# Patient Record
Sex: MALE | Race: WHITE | NOT HISPANIC OR LATINO | Employment: FULL TIME | URBAN - NONMETROPOLITAN AREA
[De-identification: names, ages, dates, MRNs, and addresses within clinical notes are randomized per-mention and may not be internally consistent; named-entity substitution may affect disease eponyms.]

---

## 2017-02-16 PROCEDURE — 96372 THER/PROPH/DIAG INJ SC/IM: CPT

## 2017-02-16 PROCEDURE — 99283 EMERGENCY DEPT VISIT LOW MDM: CPT

## 2017-02-17 ENCOUNTER — HOSPITAL ENCOUNTER (EMERGENCY)
Facility: HOSPITAL | Age: 27
Discharge: HOME | End: 2017-02-17
Attending: EMERGENCY MEDICINE | Admitting: EMERGENCY MEDICINE

## 2017-02-17 VITALS
RESPIRATION RATE: 17 BRPM | WEIGHT: 230 LBS | HEART RATE: 70 BPM | DIASTOLIC BLOOD PRESSURE: 60 MMHG | HEIGHT: 72 IN | OXYGEN SATURATION: 98 % | SYSTOLIC BLOOD PRESSURE: 122 MMHG | BODY MASS INDEX: 31.15 KG/M2 | TEMPERATURE: 98 F

## 2017-02-17 DIAGNOSIS — R51.9 NONINTRACTABLE HEADACHE, UNSPECIFIED CHRONICITY PATTERN, UNSPECIFIED HEADACHE TYPE: Primary | ICD-10-CM

## 2017-02-17 PROCEDURE — 63710000001 PROMETHAZINE PER 25 MG: Performed by: EMERGENCY MEDICINE

## 2017-02-17 PROCEDURE — 25010000002 KETOROLAC TROMETHAMINE PER 15 MG: Performed by: EMERGENCY MEDICINE

## 2017-02-17 RX ORDER — BUTALBITAL, ACETAMINOPHEN AND CAFFEINE 50; 325; 40 MG/1; MG/1; MG/1
1 TABLET ORAL EVERY 6 HOURS PRN
Qty: 10 TABLET | Refills: 0 | Status: SHIPPED | OUTPATIENT
Start: 2017-02-17 | End: 2017-09-01

## 2017-02-17 RX ORDER — KETOROLAC TROMETHAMINE 30 MG/ML
60 INJECTION, SOLUTION INTRAMUSCULAR; INTRAVENOUS ONCE
Status: COMPLETED | OUTPATIENT
Start: 2017-02-17 | End: 2017-02-17

## 2017-02-17 RX ORDER — PROMETHAZINE HYDROCHLORIDE 25 MG/1
25 TABLET ORAL ONCE
Status: COMPLETED | OUTPATIENT
Start: 2017-02-17 | End: 2017-02-17

## 2017-02-17 RX ADMIN — PROMETHAZINE HYDROCHLORIDE 25 MG: 25 TABLET ORAL at 01:20

## 2017-02-17 RX ADMIN — KETOROLAC TROMETHAMINE 60 MG: 30 INJECTION, SOLUTION INTRAMUSCULAR at 01:21

## 2017-02-17 NOTE — ED PROVIDER NOTES
TRIAGE CHIEF COMPLAINT:   Chief Complaint   Patient presents with   • Headache      HPI: Nikolas Cottrell is a 26 y.o. male who presents to the emergency department complaining of a headache.  Headache is been present over the past 2 days.  Patient describes a diffuse pounding headache that was gradual in onset.  Patient states he does not have a history of similar headaches.  Denies any trauma to his head.  Yesterday had some slight blurry vision and nausea but these have gone away and no longer has see symptoms.  No neck pain.  No fevers or chills.  No recent illness.  No numbness, tingling, or weakness in his extremities.     REVIEW OF SYSTEMS: Otherwise negative unless stated above     PAST MEDICAL HISTORY:   Past Medical History   Diagnosis Date   • Depression    • Seizures         FAMILY HISTORY:   History reviewed. No pertinent family history.     SOCIAL HISTORY:   Social History     Social History   • Marital status: Single     Spouse name: N/A   • Number of children: N/A   • Years of education: N/A     Occupational History   • Not on file.     Social History Main Topics   • Smoking status: Current Every Day Smoker   • Smokeless tobacco: Not on file   • Alcohol use Not on file   • Drug use: Not on file   • Sexual activity: Not on file     Other Topics Concern   • Not on file     Social History Narrative        SURGICAL HISTORY:   Past Surgical History   Procedure Laterality Date   • Septoplasty     • Hand surgery          CURRENT MEDICATIONS:      Medication List      Notice     You have not been prescribed any medications.         ALLERGIES: Review of patient's allergies indicates no known allergies.     PHYSICAL EXAM:   VITAL SIGNS:   Vitals:    02/17/17 0045   BP: 127/51   Pulse: 71   Resp: 18   Temp: 97.8 °F (36.6 °C)   SpO2: 97%      CONSTITUTIONAL: Awake, oriented, appears non-toxic   HENT: Atraumatic, normocephalic, oral mucosa pink and moist, airway patent. Nares patent without drainage.  External ears normal.   EYES: Conjunctiva clear, EOMI, PERRL   NECK: Trachea midline, non-tender, supple   CARDIOVASCULAR: Normal heart rate, Normal rhythm, No murmurs, rubs, gallops   PULMONARY/CHEST: Clear to auscultation, no rhonchi, wheezes, or rales. Symmetrical breath sounds.  ABDOMINAL: Non-distended, soft, non-tender - no rebound or guarding.  NEUROLOGIC: Non-focal, moving all four extremities, no gross sensory or motor deficits.   EXTREMITIES: No clubbing, cyanosis, or edema   SKIN: Warm, Dry, No erythema, No rash     ED COURSE / MEDICAL DECISION MAKING:   Nikolas Cottrell is a 26 y.o. male who presents to the emergency department for evaluation of a headache.  Patient in no acute distress on arrival.  Physical exam is unremarkable including a neurologic exam.  Patient has taken Tylenol at home but nothing else for the headache.  I don't feel imaging is currently indicated.  No neurological abnormalities, headache gradual in onset.  Treated the patient with Toradol and Phenergan in the emergency department with improvement in symptoms.  I think he is safe for discharged home.    DECISION TO DISCHARGE/ADMIT: see ED care timeline     FINAL IMPRESSION:   1 -- headache   2 --   3 --     Electronically signed by: Cecille Mason MD, 2/17/2017 1:06 AM       Cecille Mason MD  02/17/17 0226

## 2017-02-17 NOTE — DISCHARGE INSTRUCTIONS
"Most recent vital signs:   Visit Vitals   • /51   • Pulse 71   • Temp 97.8 °F (36.6 °C) (Oral)   • Resp 18   • Ht 72\" (182.9 cm)   • Wt 230 lb (104 kg)   • SpO2 97%   • BMI 31.19 kg/m2        Below you fill find any summaries of imaging results and further discharge instructions as discussed during your visit.     No orders to display        --PLEASE READ THESE DIRECTIONS IN FULL--     Our goal is to provide the best care we can AND to find any medical or surgical emergency while you are in the ER. If a medical or surgical emergency was not identified during your visit (or if the issue was resolved during the visit), following these directions for follow-up with a primary care provider and/or specialists and following the return precautions will be the safest and most effective way to protect your health after leaving the emergency room.     Therefore, in addition to the conversation we had in the room, please read all of the information in these discharge instructions, take medications as directed, and follow-up as directed.     You should seek immediate medical help for:     Worsening pain that is not helped with prescribed pain medicines and/or the recommended doses of over the counter pain medicines such as acetaminophen (Tylenol) or ibuprofen (Motrin/Advil)*.   New or worsening chest pain or trouble breathing   New or worsening headache, confusion, weakness, or visual changes   New or worsening fever (>100.4 F) that does not improve with the recommended doses of over the counter fever treatments such as acetaminophen (Tylenol) or ibuprofen (Motrin/Advil)* for more than a few hours.   Any other concerns that you feel could be life, limb, or eye-sight threatening     As a reminder, if you received any medicines or prescriptions for medicines that may be sedating (\"narcotics\", \"opioids\"), do NOT:   - operate any vehicles   - take if you are already tired   - take if you have had or plan to have alcohol   - " perform other responsibilities that require your full attention.     Common medications include, but are not limited to:   Opiates: Morphine, Norco, Lortab, Vicodin, Percocet, oxycodone, hydrocodone, Dilaudid, hydromorphone   Benzodiazepines: Ativan, Valium, alprazolam, lorazepam, diazepam,   Other sedating medications: promethazine, Phenergan, trazodone, Benadryl, hydroxyzine, Vistaril, diphenhydramine, zolpidem, and Ambien     *If you have any history of GI (stomach/intestinal) bleeding, allergic reactions, kidney problems, and/or certain heart problems or there is any chance you could be pregnant, and have been told to avoid NSAIDs (ibuprofen, naproxen, Aleve, Motrin, Advil) please avoid these medications. Please remember that prescribed pain medicines often contain Tylenol/acetaminophen, so make sure your total daily (24 hour) intake does not exceed 4 grams or 4000mg.

## 2017-03-05 ENCOUNTER — HOSPITAL ENCOUNTER (EMERGENCY)
Facility: HOSPITAL | Age: 27
Discharge: HOME | End: 2017-03-05
Attending: EMERGENCY MEDICINE | Admitting: EMERGENCY MEDICINE

## 2017-03-05 VITALS
HEIGHT: 72 IN | BODY MASS INDEX: 28.44 KG/M2 | WEIGHT: 210 LBS | SYSTOLIC BLOOD PRESSURE: 127 MMHG | HEART RATE: 83 BPM | OXYGEN SATURATION: 99 % | DIASTOLIC BLOOD PRESSURE: 75 MMHG | TEMPERATURE: 97.7 F | RESPIRATION RATE: 18 BRPM

## 2017-03-05 DIAGNOSIS — H66.001 ACUTE SUPPURATIVE OTITIS MEDIA OF RIGHT EAR WITHOUT SPONTANEOUS RUPTURE OF TYMPANIC MEMBRANE, RECURRENCE NOT SPECIFIED: Primary | ICD-10-CM

## 2017-03-05 PROCEDURE — 99282 EMERGENCY DEPT VISIT SF MDM: CPT

## 2017-03-05 RX ORDER — GUAIFENESIN 200 MG/10ML
200 LIQUID ORAL EVERY 4 HOURS PRN
Qty: 120 ML | Refills: 0 | Status: SHIPPED | OUTPATIENT
Start: 2017-03-05 | End: 2017-03-15

## 2017-03-05 RX ORDER — AMOXICILLIN AND CLAVULANATE POTASSIUM 875; 125 MG/1; MG/1
1 TABLET, FILM COATED ORAL 2 TIMES DAILY
Qty: 20 TABLET | Refills: 0 | Status: SHIPPED | OUTPATIENT
Start: 2017-03-05 | End: 2017-09-01

## 2017-03-05 RX ORDER — IBUPROFEN 800 MG/1
800 TABLET ORAL EVERY 8 HOURS PRN
Qty: 30 TABLET | Refills: 0 | Status: SHIPPED | OUTPATIENT
Start: 2017-03-05 | End: 2017-09-01

## 2017-03-05 NOTE — ED PROVIDER NOTES
Subjective   Patient is a 26 y.o. male presenting with ear pain.   History provided by:  Patient   used: No    Earache   Location:  Right  Quality:  Aching  Severity:  Moderate  Onset quality:  Sudden  Duration:  1 day  Progression:  Worsening  Chronicity:  New  Context: not direct blow, not elevation change, not foreign body in ear, not loud noise and not recent URI    Relieved by:  Nothing  Worsened by:  Nothing  Associated symptoms: rhinorrhea    Associated symptoms: no abdominal pain, no congestion, no cough, no ear discharge, no fever, no headaches, no hearing loss and no sore throat    Risk factors: no recent travel, no chronic ear infection and no prior ear surgery        Review of Systems   Constitutional: Negative for fever.   HENT: Positive for ear pain and rhinorrhea. Negative for congestion, ear discharge, hearing loss and sore throat.    Respiratory: Negative for cough.    Gastrointestinal: Negative for abdominal pain.   Neurological: Negative for headaches.   All other systems reviewed and are negative.      Past Medical History   Diagnosis Date   • Depression    • Seizures        No Known Allergies    Past Surgical History   Procedure Laterality Date   • Septoplasty     • Hand surgery         History reviewed. No pertinent family history.    Social History     Social History   • Marital status: Single     Spouse name: N/A   • Number of children: N/A   • Years of education: N/A     Social History Main Topics   • Smoking status: Current Every Day Smoker     Packs/day: 0.50   • Smokeless tobacco: None   • Alcohol use No   • Drug use: No   • Sexual activity: Not Asked     Other Topics Concern   • None     Social History Narrative   • None           Objective   Physical Exam   Constitutional: He is oriented to person, place, and time. He appears well-developed and well-nourished.   HENT:   Head: Normocephalic and atraumatic.   Right Ear: Tympanic membrane is injected, erythematous and  bulging.   Nose: Rhinorrhea present. No mucosal edema.   Mouth/Throat: Oropharynx is clear and moist.   Eyes: Conjunctivae and EOM are normal. Pupils are equal, round, and reactive to light.   Neck: Normal range of motion. Neck supple.   Cardiovascular: Normal rate, regular rhythm and normal heart sounds.    Pulmonary/Chest: Effort normal and breath sounds normal.   Abdominal: Soft. Bowel sounds are normal.   Musculoskeletal: Normal range of motion.   Neurological: He is alert and oriented to person, place, and time. He has normal reflexes.   Skin: Skin is warm and dry.   Psychiatric: He has a normal mood and affect. His behavior is normal. Judgment and thought content normal.       Procedures         ED Course  ED Course   Comment By Time   This is a 26-year-old male comes in with chief complaint right sided earache times one day.  Patient does state that he has had mild nasal congestion no reported cough, fever, chills, neck stiffness, headache. Oswald Driscoll PA-C 03/05 1141                  [facility]    Final diagnoses:   Acute suppurative otitis media of right ear without spontaneous rupture of tympanic membrane, recurrence not specified            Oswald Driscoll PA-C  03/05/17 4427

## 2017-05-09 ENCOUNTER — HOSPITAL ENCOUNTER (EMERGENCY)
Facility: HOSPITAL | Age: 27
Discharge: HOME | End: 2017-05-09
Attending: STUDENT IN AN ORGANIZED HEALTH CARE EDUCATION/TRAINING PROGRAM | Admitting: STUDENT IN AN ORGANIZED HEALTH CARE EDUCATION/TRAINING PROGRAM

## 2017-05-09 ENCOUNTER — APPOINTMENT (OUTPATIENT)
Dept: GENERAL RADIOLOGY | Facility: HOSPITAL | Age: 27
End: 2017-05-09

## 2017-05-09 VITALS
HEART RATE: 75 BPM | TEMPERATURE: 97.6 F | SYSTOLIC BLOOD PRESSURE: 127 MMHG | DIASTOLIC BLOOD PRESSURE: 65 MMHG | BODY MASS INDEX: 29.8 KG/M2 | WEIGHT: 220 LBS | HEIGHT: 72 IN | OXYGEN SATURATION: 98 % | RESPIRATION RATE: 18 BRPM

## 2017-05-09 DIAGNOSIS — S66.912A HAND STRAIN, LEFT, INITIAL ENCOUNTER: Primary | ICD-10-CM

## 2017-05-09 PROCEDURE — 99283 EMERGENCY DEPT VISIT LOW MDM: CPT

## 2017-05-09 PROCEDURE — 73130 X-RAY EXAM OF HAND: CPT

## 2017-09-01 ENCOUNTER — OFFICE VISIT (OUTPATIENT)
Dept: INTERNAL MEDICINE | Facility: CLINIC | Age: 27
End: 2017-09-01

## 2017-09-01 DIAGNOSIS — G89.4 CHRONIC PAIN SYNDROME: ICD-10-CM

## 2017-09-01 DIAGNOSIS — R12 HEARTBURN: ICD-10-CM

## 2017-09-01 DIAGNOSIS — G89.29 CHRONIC MIDLINE LOW BACK PAIN WITH SCIATICA, SCIATICA LATERALITY UNSPECIFIED: Primary | ICD-10-CM

## 2017-09-01 DIAGNOSIS — M54.40 CHRONIC MIDLINE LOW BACK PAIN WITH SCIATICA, SCIATICA LATERALITY UNSPECIFIED: Primary | ICD-10-CM

## 2017-09-01 PROCEDURE — 99214 OFFICE O/P EST MOD 30 MIN: CPT | Performed by: FAMILY MEDICINE

## 2017-09-01 RX ORDER — OMEPRAZOLE 20 MG/1
CAPSULE, DELAYED RELEASE ORAL
COMMUNITY
Start: 2017-08-31 | End: 2017-09-01 | Stop reason: SDUPTHER

## 2017-09-01 RX ORDER — HYDROCODONE BITARTRATE AND ACETAMINOPHEN 5; 325 MG/1; MG/1
1 TABLET ORAL EVERY 8 HOURS PRN
Qty: 90 TABLET | Refills: 0 | Status: SHIPPED | OUTPATIENT
Start: 2017-09-01 | End: 2018-03-12

## 2017-09-01 RX ORDER — ONDANSETRON HYDROCHLORIDE 8 MG/1
8 TABLET, FILM COATED ORAL EVERY 8 HOURS PRN
Qty: 30 TABLET | Refills: 0 | Status: SHIPPED | OUTPATIENT
Start: 2017-09-01 | End: 2018-03-12

## 2017-09-01 RX ORDER — ACETAMINOPHEN AND CODEINE PHOSPHATE 60; 300 MG/1; MG/1
TABLET ORAL
Qty: 42 TABLET | Status: CANCELLED | OUTPATIENT
Start: 2017-09-01

## 2017-09-01 RX ORDER — ONDANSETRON HYDROCHLORIDE 8 MG/1
TABLET, FILM COATED ORAL
COMMUNITY
Start: 2017-07-05 | End: 2017-09-01 | Stop reason: SDUPTHER

## 2017-09-01 RX ORDER — DULOXETIN HYDROCHLORIDE 20 MG/1
20 CAPSULE, DELAYED RELEASE ORAL DAILY
Qty: 30 CAPSULE | Refills: 1 | Status: SHIPPED | OUTPATIENT
Start: 2017-09-01 | End: 2017-12-01 | Stop reason: SDUPTHER

## 2017-09-01 RX ORDER — OMEPRAZOLE 20 MG/1
20 CAPSULE, DELAYED RELEASE ORAL DAILY
Qty: 30 CAPSULE | Refills: 5 | Status: SHIPPED | OUTPATIENT
Start: 2017-09-01 | End: 2018-03-12

## 2017-09-01 RX ORDER — ACETAMINOPHEN AND CODEINE PHOSPHATE 60; 300 MG/1; MG/1
TABLET ORAL
COMMUNITY
Start: 2017-06-29 | End: 2018-03-12

## 2017-09-02 VITALS — RESPIRATION RATE: 12 BRPM | BODY MASS INDEX: 29.66 KG/M2 | HEART RATE: 72 BPM | HEIGHT: 72 IN | WEIGHT: 219 LBS
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
## 2017-11-28 ENCOUNTER — HOSPITAL ENCOUNTER (EMERGENCY)
Facility: HOSPITAL | Age: 27
Discharge: HOME | End: 2017-11-28
Attending: EMERGENCY MEDICINE | Admitting: EMERGENCY MEDICINE

## 2017-11-28 ENCOUNTER — APPOINTMENT (OUTPATIENT)
Dept: GENERAL RADIOLOGY | Facility: HOSPITAL | Age: 27
End: 2017-11-28

## 2017-11-28 VITALS
HEIGHT: 72 IN | BODY MASS INDEX: 29.8 KG/M2 | DIASTOLIC BLOOD PRESSURE: 82 MMHG | WEIGHT: 220 LBS | HEART RATE: 85 BPM | OXYGEN SATURATION: 98 % | RESPIRATION RATE: 16 BRPM | TEMPERATURE: 98.2 F | SYSTOLIC BLOOD PRESSURE: 122 MMHG

## 2017-11-28 DIAGNOSIS — R07.89 ATYPICAL CHEST PAIN: Primary | ICD-10-CM

## 2017-11-28 LAB
ALBUMIN SERPL-MCNC: 4.6 G/DL (ref 3.5–5)
ALBUMIN/GLOB SERPL: 1.8 G/DL (ref 1–2)
ALP SERPL-CCNC: 83 U/L (ref 38–126)
ALT SERPL W P-5'-P-CCNC: 31 U/L (ref 13–69)
ANION GAP SERPL CALCULATED.3IONS-SCNC: 17.7 MMOL/L
AST SERPL-CCNC: 17 U/L (ref 15–46)
BASOPHILS # BLD AUTO: 0.07 10*3/MM3 (ref 0–0.2)
BASOPHILS NFR BLD AUTO: 1 % (ref 0–2.5)
BILIRUB SERPL-MCNC: 0.5 MG/DL (ref 0.2–1.3)
BUN BLD-MCNC: 16 MG/DL (ref 7–20)
BUN/CREAT SERPL: 17.8 (ref 6.3–21.9)
CALCIUM SPEC-SCNC: 9.3 MG/DL (ref 8.4–10.2)
CHLORIDE SERPL-SCNC: 105 MMOL/L (ref 98–107)
CO2 SERPL-SCNC: 25 MMOL/L (ref 26–30)
CREAT BLD-MCNC: 0.9 MG/DL (ref 0.6–1.3)
DEPRECATED RDW RBC AUTO: 40 FL (ref 37–54)
EOSINOPHIL # BLD AUTO: 0.17 10*3/MM3 (ref 0–0.7)
EOSINOPHIL NFR BLD AUTO: 2.4 % (ref 0–7)
ERYTHROCYTE [DISTWIDTH] IN BLOOD BY AUTOMATED COUNT: 11.8 % (ref 11.5–14.5)
GFR SERPL CREATININE-BSD FRML MDRD: 100 ML/MIN/1.73
GLOBULIN UR ELPH-MCNC: 2.5 GM/DL
GLUCOSE BLD-MCNC: 106 MG/DL (ref 74–98)
HCT VFR BLD AUTO: 47.8 % (ref 42–52)
HGB BLD-MCNC: 16 G/DL (ref 14–18)
HOLD SPECIMEN: NORMAL
IMM GRANULOCYTES # BLD: 0.01 10*3/MM3 (ref 0–0.06)
IMM GRANULOCYTES NFR BLD: 0.1 % (ref 0–0.6)
LYMPHOCYTES # BLD AUTO: 2.43 10*3/MM3 (ref 0.6–3.4)
LYMPHOCYTES NFR BLD AUTO: 34.2 % (ref 10–50)
MCH RBC QN AUTO: 30.9 PG (ref 27–31)
MCHC RBC AUTO-ENTMCNC: 33.5 G/DL (ref 30–37)
MCV RBC AUTO: 92.3 FL (ref 80–94)
MONOCYTES # BLD AUTO: 0.52 10*3/MM3 (ref 0–0.9)
MONOCYTES NFR BLD AUTO: 7.3 % (ref 0–12)
NEUTROPHILS # BLD AUTO: 3.91 10*3/MM3 (ref 2–6.9)
NEUTROPHILS NFR BLD AUTO: 55 % (ref 37–80)
NRBC BLD MANUAL-RTO: 0 /100 WBC (ref 0–0)
PLATELET # BLD AUTO: 249 10*3/MM3 (ref 130–400)
PMV BLD AUTO: 9.1 FL (ref 6–12)
POTASSIUM BLD-SCNC: 3.7 MMOL/L (ref 3.5–5.1)
PROT SERPL-MCNC: 7.1 G/DL (ref 6.3–8.2)
RBC # BLD AUTO: 5.18 10*6/MM3 (ref 4.7–6.1)
SODIUM BLD-SCNC: 144 MMOL/L (ref 137–145)
TROPONIN I SERPL-MCNC: 0 NG/ML (ref 0–0.05)
TROPONIN I SERPL-MCNC: <0.012 NG/ML (ref 0–0.03)
WBC NRBC COR # BLD: 7.11 10*3/MM3 (ref 4.8–10.8)
WHOLE BLOOD HOLD SPECIMEN: NORMAL

## 2017-11-28 PROCEDURE — 80053 COMPREHEN METABOLIC PANEL: CPT | Performed by: EMERGENCY MEDICINE

## 2017-11-28 PROCEDURE — 99283 EMERGENCY DEPT VISIT LOW MDM: CPT

## 2017-11-28 PROCEDURE — 84484 ASSAY OF TROPONIN QUANT: CPT | Performed by: EMERGENCY MEDICINE

## 2017-11-28 PROCEDURE — 93005 ELECTROCARDIOGRAM TRACING: CPT | Performed by: EMERGENCY MEDICINE

## 2017-11-28 PROCEDURE — 71020 HC CHEST PA AND LATERAL: CPT

## 2017-11-28 PROCEDURE — 85025 COMPLETE CBC W/AUTO DIFF WBC: CPT | Performed by: EMERGENCY MEDICINE

## 2017-11-28 RX ORDER — SODIUM CHLORIDE 0.9 % (FLUSH) 0.9 %
10 SYRINGE (ML) INJECTION AS NEEDED
Status: DISCONTINUED | OUTPATIENT
Start: 2017-11-28 | End: 2017-11-28 | Stop reason: HOSPADM

## 2017-11-28 RX ORDER — ASPIRIN 325 MG
325 TABLET ORAL ONCE
Status: COMPLETED | OUTPATIENT
Start: 2017-11-28 | End: 2017-11-28

## 2017-11-28 RX ORDER — SODIUM CHLORIDE 0.9 % (FLUSH) 0.9 %
10 SYRINGE (ML) INJECTION AS NEEDED
Status: DISCONTINUED | OUTPATIENT
Start: 2017-11-28 | End: 2017-11-28

## 2017-11-28 RX ADMIN — ASPIRIN 325 MG ORAL TABLET 325 MG: 325 PILL ORAL at 14:56

## 2017-12-01 ENCOUNTER — OFFICE VISIT (OUTPATIENT)
Dept: INTERNAL MEDICINE | Facility: CLINIC | Age: 27
End: 2017-12-01

## 2017-12-01 VITALS
BODY MASS INDEX: 29.8 KG/M2 | DIASTOLIC BLOOD PRESSURE: 76 MMHG | WEIGHT: 220 LBS | HEART RATE: 99 BPM | TEMPERATURE: 98.5 F | OXYGEN SATURATION: 100 % | SYSTOLIC BLOOD PRESSURE: 110 MMHG | HEIGHT: 72 IN

## 2017-12-01 DIAGNOSIS — M54.40 CHRONIC MIDLINE LOW BACK PAIN WITH SCIATICA, SCIATICA LATERALITY UNSPECIFIED: ICD-10-CM

## 2017-12-01 DIAGNOSIS — G89.29 CHRONIC BILATERAL LOW BACK PAIN WITHOUT SCIATICA: Primary | ICD-10-CM

## 2017-12-01 DIAGNOSIS — Z82.49 FAMILY HISTORY OF EARLY CAD: ICD-10-CM

## 2017-12-01 DIAGNOSIS — M54.50 CHRONIC BILATERAL LOW BACK PAIN WITHOUT SCIATICA: Primary | ICD-10-CM

## 2017-12-01 DIAGNOSIS — R07.89 ATYPICAL CHEST PAIN: Primary | ICD-10-CM

## 2017-12-01 DIAGNOSIS — Z00.00 PREVENTATIVE HEALTH CARE: ICD-10-CM

## 2017-12-01 DIAGNOSIS — G89.29 CHRONIC MIDLINE LOW BACK PAIN WITH SCIATICA, SCIATICA LATERALITY UNSPECIFIED: ICD-10-CM

## 2017-12-01 DIAGNOSIS — G89.4 CHRONIC PAIN SYNDROME: ICD-10-CM

## 2017-12-01 PROCEDURE — 99214 OFFICE O/P EST MOD 30 MIN: CPT | Performed by: PHYSICIAN ASSISTANT

## 2017-12-01 RX ORDER — DULOXETIN HYDROCHLORIDE 20 MG/1
20 CAPSULE, DELAYED RELEASE ORAL DAILY
Qty: 30 CAPSULE | Refills: 5 | Status: SHIPPED | OUTPATIENT
Start: 2017-12-01 | End: 2018-03-12

## 2017-12-01 NOTE — PROGRESS NOTES
Nikolas Cottrell is a 27 y.o. male.     Subjective   History of Present Illness   Here today for ED follow up of intermittent right-central chest pain that lasts minutes at a time and is slightly worse with standing up straight. ED workup included EKG, chest x-ray and Troponin which were negative.  He described the pain as a constant pressure that made it hard to eat sometimes. He denies acid reflux. Takes Omeprazole but not regularly.  Has not had any chest pain since yesterday.  Current light smoker. Has a strong family history of MI in his father who had 6 events in 5 years beginning around age 42. Denies any chest pain with exertion. 3-4 weeks ago he had a 30 minute event of SOA which resolved on its own and has not happened again.         The following portions of the patient's history were reviewed and updated as appropriate: allergies, current medications, past family history, past medical history, past social history, past surgical history and problem list.    Review of Systems    Constitutional: Negative for appetite change, chills, fatigue, fever and unexpected weight change.   HENT: Negative for congestion, ear pain, hearing loss, nosebleeds, postnasal drip, rhinorrhea, sore throat, tinnitus and trouble swallowing.    Eyes: Negative for photophobia, discharge and visual disturbance.   Respiratory: SOA. Negative for cough, chest tightness and wheezing.    Cardiovascular: chest pain. Negative for palpitations and leg swelling.   Gastrointestinal: Negative for abdominal distention, abdominal pain, blood in stool, constipation, diarrhea, nausea and vomiting.   Endocrine: Negative for cold intolerance, heat intolerance, polydipsia, polyphagia and polyuria.   Musculoskeletal: Negative for arthralgias, back pain, joint swelling, myalgias, neck pain and neck stiffness.   Skin: Negative for color change, pallor, rash and wound.   Allergic/Immunologic: Negative for environmental allergies, food allergies and  "immunocompromised state.   Neurological: Negative for dizziness, tremors, seizures, weakness, numbness and headaches.   Hematological: Negative for adenopathy. Does not bruise/bleed easily.   Psychiatric/Behavioral: Negative for sleep disturbances, agitation, behavioral problems, confusion, hallucinations, self-injury and suicidal ideas. The patient is not nervous/anxious.      Objective    Physical Exam  Constitutional: Oriented to person, place, and time. Appears well-developed and well-nourished.   HENT: OP normal.   Head: Normocephalic and atraumatic.   Eyes: EOM are normal. Pupils are equal, round, and reactive to light.   Neck: Normal range of motion. Neck supple.   Cardiovascular: Normal rate, regular rhythm and normal heart sounds.    Pulmonary/Chest: Effort normal and breath sounds normal. No respiratory distress.  Has no wheezes or rales. Exhibits no chest wall tenderness.   Abdominal: Soft. Bowel sounds are normal. Exhibits no distension and no mass. There is no tenderness.   Musculoskeletal: Normal range of motion. Exhibits no tenderness.   Neurological: Alert and oriented to person, place, and time.   Skin: Skin is warm and dry.   Psychiatric: Has a normal mood and affect. Behavior is normal. Judgment and thought content normal.       /76  Pulse 99  Temp 98.5 °F (36.9 °C)  Ht 72\" (182.9 cm)  Wt 220 lb (99.8 kg)  SpO2 100%  BMI 29.84 kg/m2    Nursing note and vitals reviewed.        Assessment/Plan   Nikolas was seen today for chest pain.    Diagnoses and all orders for this visit:    Atypical chest pain  -     Lipid Panel  -     Treadmill Stress Test    Family history of early CAD  -     Lipid Panel  -     Treadmill Stress Test    Preventative health care  -     Lipid Panel      Encouraged smoking cessation.   Encouraged use of Omeprazole daily for the next 2 weeks in case GERD is contributing to pain.     ED record reviewed.        "

## 2018-03-12 ENCOUNTER — OFFICE VISIT (OUTPATIENT)
Dept: PSYCHIATRY | Facility: CLINIC | Age: 28
End: 2018-03-12

## 2018-03-12 VITALS
HEART RATE: 86 BPM | HEIGHT: 72 IN | WEIGHT: 243 LBS | BODY MASS INDEX: 32.91 KG/M2 | SYSTOLIC BLOOD PRESSURE: 126 MMHG | DIASTOLIC BLOOD PRESSURE: 80 MMHG

## 2018-03-12 DIAGNOSIS — F40.00 AGORAPHOBIA: ICD-10-CM

## 2018-03-12 DIAGNOSIS — F33.1 MODERATE EPISODE OF RECURRENT MAJOR DEPRESSIVE DISORDER (HCC): ICD-10-CM

## 2018-03-12 DIAGNOSIS — F41.1 GENERALIZED ANXIETY DISORDER: Primary | ICD-10-CM

## 2018-03-12 PROCEDURE — 90792 PSYCH DIAG EVAL W/MED SRVCS: CPT | Performed by: NURSE PRACTITIONER

## 2018-03-12 RX ORDER — VENLAFAXINE HYDROCHLORIDE 37.5 MG/1
37.5 CAPSULE, EXTENDED RELEASE ORAL DAILY
Qty: 7 CAPSULE | Refills: 0 | Status: SHIPPED | OUTPATIENT
Start: 2018-03-12 | End: 2019-03-12

## 2018-03-12 RX ORDER — PROPRANOLOL HYDROCHLORIDE 10 MG/1
TABLET ORAL
Qty: 60 TABLET | Refills: 0 | Status: SHIPPED | OUTPATIENT
Start: 2018-03-12 | End: 2018-04-09

## 2018-03-12 RX ORDER — VENLAFAXINE HYDROCHLORIDE 75 MG/1
75 CAPSULE, EXTENDED RELEASE ORAL DAILY
Qty: 30 CAPSULE | Refills: 0 | Status: SHIPPED | OUTPATIENT
Start: 2018-03-12 | End: 2019-03-12

## 2018-03-12 NOTE — PROGRESS NOTES
"  Subjective   Nikolas Cottrell is an unemployed  27 y.o. male who is here today for initial appointment with his fiance and two young children. His fiance comes here so made appointmetn because of anxiety and depression. His PCP is Dr. Marleen Patrick.  Patient is from [place] and moved here to [place], [place] two years ago with his fiance and new born daughter for a \"fresh start\". He worked as  until two months ago when he quit his work so he can devote going to [place] to regain custody of his 8yo son. His fiance is working now. Patient is a high school graduate     Chief Complaint:  Anxiety depression panic       History of Present Illness Patient presents with his fiance and two children daughter 3yo and son 8 months. The patient reports the following symptoms of anxiety: constant anxiety/worry, restlessness/on edge, difficulty concentrating, mind goes blank, muscle tension, sleep disturbance and anxiety causes distress/impairment in important areas of functioning and have caused impairment in important areas of functioning. He states this happens when he is not home, when he is  with his fiance or children he does well. He states anxiety became overwhelming with his mother  when he was 15 yo and his dad began drinking heavier \"alcoholic\". He states his sister has epilepsy and now is in group home. Pt reports having suicidal thoughts after his mother  but no attempts. He went and got therapy and was placed on med management. He reports being on xanax for 6 years and was trialed on Zoloft which he reports was ineffective so stopped it after 3 months. He has panic feelings when out of his house. When he worked he worried about his family and it was constant until he got home. The patient reports the following panic symptoms: palpitations/pounding heart, trembling, chest discomfort, nausea/abdominal distress, fear of losing control or \"going crazy\", fear of dying, persistent worry about " future panic attacks and avoidance of triggers which have collectively caused impairment in important areas of functioning. Panic symptoms usually last until he can remove himself from public area. Panic attacks are reported approximately 5 times per week.The patient reports depressive symptoms including depressed mood, insomnia, feelings of guilt, feelings of helplessness, feelings of worthlessness, low energy and difficulty concentrating, present on most days for the past 6 month(s)  and have caused impairment in important areas of functioning. Depression rated 6/10 with 10 being the worst. He denies AVH, denies SI/HI now, denies illicit drug use. Family is not on night time sleep schedule. They report staying up because 1 yo is wide awake until 2, 3, 5 am and then sleeps until at least 11am. Miguel works during day shift, patient is caring for 8 month old and 1 yo. Patient denies aggression towards others, denies OCD, PTSD, renny, has counted in head when he was working but not now. He denies legal issues current. He is trying to get full custody of his son who is with maternal grandparent.     The following portions of the patient's history were reviewed and updated as appropriate: allergies, current medications, past family history, past medical history, past social history, past surgical history and problem list.      Past Psych History:  Denies inpatient, denies suicidal attempts ,has had suicidal thoughts after his mother  when he was 17yo. Has had outpatient therapy and med management with zoloft ineffective for him stopped it after 3 months, has been on benzodiazapine, he reports stopping it himself 2-3 years ago (he also moved to [place] from [place] at that time). He reports no self harming.     Substance Abuse:   Nicotine: 3-4 cigarettes daily   Alcohol: denies use   Cannabis: denies   Benzodiazepines: none since 2 years ago was prescribed   Opioids: occasional use prescribed not abused per pt for back  "pain   Other illicit drugs:  Denies     ABUSE HX:  Father when drinking alcohol after his mother  verbal emotional and physical \"until I got too big for that and I wouldn't take it\".  Dad's recurrence of drinking \"he said stuff to me a month ago I just don't want to be around him or talk with him\".   TRAUMA : mother  abruptly from blood clot \"then I had high anxiety and regular  thoughts  of suicide\". 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      Social History: raised by parents in [place] had brother who was 7 yo  and when he was less than [age] years old. He reports his mother  when he was 15yo and his dad began drinking heavily \"alcoholic\". Has difficult relationship with dad because of dad's drinking. Sister has eplipsy and is in group home. He graduated from high school and then worked and got into relationship and had a son, he is 10yo now. Patient met his fiance 6 years ago and they have two children 1 yo daughter and 8 month old son. He is not working currently. He is working on getting full custody       Medical/Surgical History:  Past Medical History:   Diagnosis Date   • Back pain    • Depression    • Seizures      Past Surgical History:   Procedure Laterality Date   • HAND SURGERY     • SEPTOPLASTY         No Known Allergies    Current Medications:   Current Outpatient Prescriptions   Medication Sig Dispense Refill   • propranolol (INDERAL) 10 MG tablet Twice a day as needed for anxiety 60 tablet 0   • venlafaxine XR (EFFEXOR XR) 37.5 MG 24 hr capsule Take 1 capsule by mouth Daily. 7 capsule 0   • venlafaxine XR " "(EFFEXOR XR) 75 MG 24 hr capsule Take 1 capsule by mouth Daily. 30 capsule 0     No current facility-administered medications for this visit.          Review of Systems   Constitutional: Positive for activity change (low energy).   HENT: Negative.    Eyes: Negative.    Respiratory: Negative.    Cardiovascular: Positive for chest pain (went to ED going to have stress test).   Gastrointestinal: Negative.    Endocrine: Negative.    Genitourinary: Negative.    Musculoskeletal: Positive for back pain.   Skin: Negative.    Allergic/Immunologic: Negative.    Neurological: Negative.    Hematological: Negative.    Psychiatric/Behavioral: Positive for dysphoric mood.        Objective   Physical Exam  Blood pressure 126/80, pulse 86, height 182.9 cm (72\"), weight 110 kg (243 lb). Body mass index is 32.96 kg/m².      Mental Status Exam:   Appearance: appropriate  Hygiene:   good  Cooperation:  Cooperative  Eye Contact:  Good  Psychomotor Behavior:  Appropriate  Mood:  Anxious depressed   Affect:  Restricted no smiles when kids do cute things in session   Hopelessness: Denies  Speech:  Normal  Thought Process:  Goal directed and Linear  Thought Content:  Normal  Suicidal:  None  Homicidal:  None  Hallucinations:  None  Delusion:  None  Memory:  Intact  Orientation:  Person, Place, Time and Situation  Reliability:  fair  Insight:  Fair  Judgement:  Fair  Impulse Control:  Fair  Physical/Medical Issues:  No       Short-term goals: Patient will be compliant with clinic appointments.  Patient will be engaged in therapy, medication compliant with minimal side effects. Patient  will report decrease of symptoms and frequency.    Long-term goals: Patient will have minimal symptoms of mental health disorder with continued treatment. Patient will be compliant with treatment and appointments.       Problem list: ongoing chronic anxiety since 15yo, depression recurrent, stressors financial and getting 10yo custody   Strengths: patient " appears motivated for treatment is currently engaged and compliant  Weaknesses: mother  when he was 15yo, dad alcoholic, social family support deficits        Assessment/Plan   Diagnoses and all orders for this visit:    Generalized anxiety disorder    Agoraphobia    Moderate episode of recurrent major depressive disorder    Other orders  -     venlafaxine XR (EFFEXOR XR) 37.5 MG 24 hr capsule; Take 1 capsule by mouth Daily.  -     venlafaxine XR (EFFEXOR XR) 75 MG 24 hr capsule; Take 1 capsule by mouth Daily.  -     propranolol (INDERAL) 10 MG tablet; Twice a day as needed for anxiety    labs reviewed when pt was in ED in 2017 for chest pain   A psychological evaluation was conducted in order to assess past and current level of functioning. Areas assessed included, but were not limited to: perception of social support, perception of ability to face and deal with challenges in life (positive functioning), anxiety symptoms, depressive symptoms, perspective on beliefs/belief system, coping skills for stress, intelligence level,  Therapeutic rapport was established. Interventions conducted today were geared towards incorporating medication management along with support for continued therapy. Education was also provided as to the med management with this provider and what to expect in subsequent sessions.  ·   Recommendation   Therapy  Med management with   Venlafaxine XR begin with 37.5mg PO one QAm #7 then increase to 75mg PO QAM  Propranolol 10mg PO one BID as needed when going into known stressful situations     We discussed risks, benefits,goals and side effects of the above medication and the patient was agreeable with the plan.Patient was educated on the importance of compliance with treatment and follow-up appointments.To call for questions or concerns and return early if necessary. Crisis plan reviewed including going to the Emergency department.     Return in about 4 weeks (around 2018).

## 2018-04-09 ENCOUNTER — OFFICE VISIT (OUTPATIENT)
Dept: PSYCHIATRY | Facility: CLINIC | Age: 28
End: 2018-04-09

## 2018-04-09 VITALS
DIASTOLIC BLOOD PRESSURE: 86 MMHG | SYSTOLIC BLOOD PRESSURE: 130 MMHG | HEIGHT: 72 IN | WEIGHT: 243 LBS | BODY MASS INDEX: 32.91 KG/M2

## 2018-04-09 DIAGNOSIS — F33.1 MODERATE EPISODE OF RECURRENT MAJOR DEPRESSIVE DISORDER (HCC): ICD-10-CM

## 2018-04-09 DIAGNOSIS — F41.1 GENERALIZED ANXIETY DISORDER: Primary | ICD-10-CM

## 2018-04-09 DIAGNOSIS — F40.00 AGORAPHOBIA: ICD-10-CM

## 2018-04-09 PROCEDURE — 99214 OFFICE O/P EST MOD 30 MIN: CPT | Performed by: NURSE PRACTITIONER

## 2018-04-09 RX ORDER — QUETIAPINE FUMARATE 25 MG/1
TABLET, FILM COATED ORAL
Qty: 30 TABLET | Refills: 1 | Status: SHIPPED | OUTPATIENT
Start: 2018-04-09 | End: 2020-09-17

## 2018-04-09 NOTE — PROGRESS NOTES
Subjective   Nikolas Cottrell is a 27 y.o. male who is here today for medication management follow up.    Chief Complaint: Diagnoses and all orders for this visit:    Generalized anxiety disorder    Agoraphobia    Moderate episode of recurrent major depressive disorder    Other orders  -     QUEtiapine (SEROquel) 25 MG tablet; Take 1/2 tablet twice a day        History of Present Illness Patient presents by himself stating he felt jittery and awful on medications so stopped them. He reports he was taking all three that were ordered. I reviewed the medications that the Effexor XR 37.5mg was to be taken once a day in mornings and only that, there were only 7 pills and then the Effexor XR 75mg was to be taken after the 37.5mg was completed, as we discussed the day he was here and written instructions were given. He also states he took the propranolol didn't have any effect for anxiety so he stopped it . Patient reports cont anxiety as high The patient reports the following symptoms of anxiety: constant anxiety/worry, restlessness/on edge, difficulty concentrating, mind goes blank, irritability, muscle tension, sleep disturbance and anxiety causes distress/impairment in important areas of functioning and have caused impairment in important areas of functioning. Scores it a 6-9 most days. He isn't interested as yet in therapy. He just got back from [place] visiting his son whom he hopes to have full custody of, goes to court in May and visitation and hoping to have him the summer then court in date in August then hoping to have custody. He and fiance and children are doing well together. Pt not working, fiance did get a job. He reports sleeping well. Depression scored a 5.      Denies adverse effects from medications.   (Scales based on 0 - 10 with 10 being the worst)        The following portions of the patient's history were reviewed and updated as appropriate: allergies, current medications, past family  "history, past medical history, past social history, past surgical history and problem list.    Review of Systemsdenies fever, cough, s/s’s of infection, denies GI/ problems, denies new medical issues     Objective   Physical Exam  Blood pressure 130/86, height 182.9 cm (72\"), weight 110 kg (243 lb).    No Known Allergies    Current Medications:   Current Outpatient Prescriptions   Medication Sig Dispense Refill   • QUEtiapine (SEROquel) 25 MG tablet Take 1/2 tablet twice a day 30 tablet 1   • venlafaxine XR (EFFEXOR XR) 37.5 MG 24 hr capsule Take 1 capsule by mouth Daily. 7 capsule 0   • venlafaxine XR (EFFEXOR XR) 75 MG 24 hr capsule Take 1 capsule by mouth Daily. 30 capsule 0     No current facility-administered medications for this visit.             Appearance: appropriate  Hygiene:   good  Cooperation:  Cooperative  Eye Contact:  Good  Psychomotor Behavior:  Appropriate  Mood:  Anxiety with dysthymia   Affect:  Appropriate  Hopelessness: Denies  Speech:  Normal  Thought Process:  Linear  Thought Content:  Normal  Suicidal:  None  Homicidal:  None  Hallucinations:  None  Delusion:  None  Memory:  Intact  Orientation:  Person, Place, Time and Situation  Reliability:  fair  Insight:  Fair  Judgement:  Fair  Impulse Control:  Fair  Estimated Intelligence: average range   Physical/Medical Issues:  No         ALEXA REVIEWED NO RED FLAGS  UDS:    Assessment/Plan   Diagnoses and all orders for this visit:    Generalized anxiety disorder    Agoraphobia    Moderate episode of recurrent major depressive disorder    Other orders  -     QUEtiapine (SEROquel) 25 MG tablet; Take 1/2 tablet twice a day    25 minutes face to face   pt encouraged to resume the effexor XR 37.5mg PO one QD x 5 days then the 75mg daily not both at same time as he had SE and stopped them. He stopped the propranolol because he didn't feel it was helpful at all.   Discussed therapy and rationale for it he declined.  Discussed anxiety and depression " ,  Recommend in addition to Effexor XR adding Seroquel for ABDIAZIZ 12.5 mg BID and one 25mg tablet at night for sleep   We discussed risks, benefits, and side effects of the above medications and the patient was agreeable with the plan. Patient was educated on the importance of compliance with treatment and follow-up appointments.     Sleep hygiene reviewed, encouraged more whole foods, less processed foods, fruits   Coping skills reviewed and encouraged positive framing of thoughts    Assisted patient in processing above session content; acknowledged and normalized patient’s thoughts, feelings, and concerns.  Applied  positive coping skills and behavior management in session.  Allowed patient to freely discuss issues without interruption or judgment. Provided safe, confidential environment to facilitate the development of positive therapeutic relationship and encourage open, honest communication. Assisted patient in identifying risk factors which would indicate the need for higher level of care including thoughts to harm self or others and/or self-harming behavior and encouraged patient to contact this office, call 911, or present to the nearest emergency room should any of these events occur. Discussed crisis intervention services and means to access.  Patient adamantly and convincingly denies current suicidal or homicidal ideation or perceptual disturbance.    Instructed to call for questions or concerns and return early if necessary. Crisis plan reviewed including going to the Emergency department.    Return in about 5 weeks (around 5/14/2018).         Please note that portions of this note were completed with a voice recognition program.  Efforts were made to edit dictation, but occasionally words are mistranscribed.

## 2018-11-18 ENCOUNTER — APPOINTMENT (OUTPATIENT)
Dept: GENERAL RADIOLOGY | Facility: HOSPITAL | Age: 28
End: 2018-11-18

## 2018-11-18 ENCOUNTER — HOSPITAL ENCOUNTER (EMERGENCY)
Facility: HOSPITAL | Age: 28
Discharge: HOME | End: 2018-11-18
Attending: EMERGENCY MEDICINE | Admitting: EMERGENCY MEDICINE

## 2018-11-18 VITALS
SYSTOLIC BLOOD PRESSURE: 125 MMHG | DIASTOLIC BLOOD PRESSURE: 94 MMHG | WEIGHT: 220 LBS | RESPIRATION RATE: 18 BRPM | HEIGHT: 72 IN | OXYGEN SATURATION: 99 % | BODY MASS INDEX: 29.8 KG/M2 | HEART RATE: 109 BPM | TEMPERATURE: 98.1 F

## 2018-11-18 DIAGNOSIS — S67.10XA CRUSH INJURY TO FINGER, INITIAL ENCOUNTER: ICD-10-CM

## 2018-11-18 DIAGNOSIS — S62.667A CLOSED NONDISPLACED FRACTURE OF DISTAL PHALANX OF LEFT LITTLE FINGER, INITIAL ENCOUNTER: Primary | ICD-10-CM

## 2018-11-18 PROCEDURE — 99283 EMERGENCY DEPT VISIT LOW MDM: CPT

## 2018-11-18 PROCEDURE — 73110 X-RAY EXAM OF WRIST: CPT

## 2018-11-18 PROCEDURE — 73130 X-RAY EXAM OF HAND: CPT

## 2018-11-18 RX ORDER — HYDROCODONE BITARTRATE AND ACETAMINOPHEN 5; 325 MG/1; MG/1
1 TABLET ORAL EVERY 8 HOURS PRN
Qty: 10 TABLET | Refills: 0 | Status: SHIPPED | OUTPATIENT
Start: 2018-11-18 | End: 2020-09-17

## 2018-11-18 RX ORDER — IBUPROFEN 600 MG/1
600 TABLET ORAL ONCE
Status: DISCONTINUED | OUTPATIENT
Start: 2018-11-18 | End: 2018-11-18

## 2018-11-18 RX ORDER — HYDROCODONE BITARTRATE AND ACETAMINOPHEN 5; 325 MG/1; MG/1
1 TABLET ORAL EVERY 8 HOURS PRN
Qty: 10 TABLET | Refills: 0 | Status: SHIPPED | OUTPATIENT
Start: 2018-11-18 | End: 2018-11-18 | Stop reason: SDUPTHER

## 2018-11-18 RX ORDER — ACETAMINOPHEN 500 MG
1000 TABLET ORAL ONCE
Status: COMPLETED | OUTPATIENT
Start: 2018-11-18 | End: 2018-11-18

## 2018-11-18 RX ADMIN — ACETAMINOPHEN 1000 MG: 500 TABLET, FILM COATED ORAL at 20:11

## 2018-11-18 NOTE — ED PROVIDER NOTES
Subjective   Presents to the emergency department with complaint of pain to the left fifth finger of his hand and wrist onset approximate 9 AM this morning when, at work, he had some heavy machinery crushed his hand.  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  Patient's amputations have healed very well.  5th finger is swollen with some ecchymosis.         History provided by:  Medical records and patient   used: No    Upper Extremity Issue   Location:  Finger  Finger location:  L little finger  Injury: yes    Time since incident:  10 hours  Mechanism of injury: crush    Crush injury:     Mechanism:  Industrial machinery and falling object    Approximate weight of object:  300  Pain details:     Quality:  Aching    Radiates to:  Does not radiate    Severity:  Moderate    Onset quality:  Sudden    Duration:  10 hours  Prior injury to area:  Yes (hx of amputation to the left 3,4,5th digits 3 years ago; amputated with a router. )  Worsened by:  Nothing  Associated symptoms: swelling    Associated symptoms: no numbness        Review of Systems   Musculoskeletal:        Crushing injury left hand.  See HPI.  Denies any additional injury or complaint.   All other systems reviewed and are negative.      Past Medical History:   Diagnosis Date   • Back pain    • Depression    • Seizures (CMS/HCC)        No Known Allergies    Past Surgical History:   Procedure Laterality Date   • HAND SURGERY     • SEPTOPLASTY         Family History   Problem Relation Age of Onset   • Stroke Father    • Heart attack Father    • Hypertension Father        Social History     Socioeconomic History   • Marital status: Single     Spouse name: Not on file   • Number of children: Not on file   • Years of education: Not on file   • Highest education level: Not on file   Tobacco Use   • Smoking status: Current Every  Day Smoker     Packs/day: 0.50   • Smokeless tobacco: Never Used   Substance and Sexual Activity   • Alcohol use: No   • Drug use: No   • Sexual activity: Yes     Partners: Female           Objective   Physical Exam   Constitutional: He is oriented to person, place, and time. He appears well-developed and well-nourished. No distress.   HENT:   Head: Normocephalic.   Neck: Normal range of motion.   Cardiovascular: Normal rate and regular rhythm.   Pulmonary/Chest: Effort normal and breath sounds normal.   Musculoskeletal:   LEFT HAND:  Ecchymosis and sts are present circumferentially to the 5th finger but the distal nv exam is negative.  Flexion and extension are intact.  There is some mild ecchymosis in the palm adjacent to the 5th digit.  Tender at the ulnar portion of the wrist dorally. Nv exam is negative.  ROM is normal, but reproducing presenting pain.  There is no gross deformity.  Proximal joints are negative     Neurological: He is alert and oriented to person, place, and time. No sensory deficit. Coordination normal.   Skin: Skin is warm and dry. Capillary refill takes less than 2 seconds. No rash noted. He is not diaphoretic. No erythema. No pallor.   Psychiatric: He has a normal mood and affect. His behavior is normal. Judgment and thought content normal.       Procedures           ED Course  ED Course as of Nov 18 2024   Sun Nov 18, 2018 2012 I have reviewed the x-rays together with Dr. Mason and we concurred that there appears to be an avulsion at the distal joint.  Patient has normal range of motion though painfully and we will refer him to orthopedics and splinting.  Patient understands and concurs with plan of care.  [ML]      ED Course User Index  [ML] Lolita Moreno, AISHA      No results found for this or any previous visit (from the past 24 hour(s)).  Note: In addition to lab results from this visit, the labs listed above may include labs taken at another facility or during a different  "encounter within the last 24 hours. Please correlate lab times with ED admission and discharge times for further clarification of the services performed during this visit.    XR Hand 3+ View Left    (Results Pending)   XR Wrist 3+ View Left    (Results Pending)     Vitals:    11/18/18 1809   BP: 130/88   BP Location: Right arm   Patient Position: Sitting   Pulse: 109   Resp: 18   Temp: 98.1 °F (36.7 °C)   TempSrc: Oral   SpO2: 97%   Weight: 99.8 kg (220 lb)   Height: 182.9 cm (72\")     Medications   acetaminophen (TYLENOL) tablet 1,000 mg (1,000 mg Oral Given 11/18/18 2011)     ECG/EMG Results (last 24 hours)     ** No results found for the last 24 hours. **                    [facility]      Final diagnoses:   Closed nondisplaced fracture of distal phalanx of left little finger, initial encounter   Crush injury to finger, initial encounter            Lolita Moreno, AISHA  11/18/18 2024    "

## 2018-11-19 NOTE — DISCHARGE INSTRUCTIONS
Call the office of orthopedic surgeon, Dr. Adams or a referred appointment.  Use the splinting material in the meantime to facilitate recovery.  Avoid aspirin containing products.  He may use the prescription pain medication for that pain that breaks through Motrin 600 mg every 8 hours.  Thank you

## 2019-03-22 ENCOUNTER — APPOINTMENT (OUTPATIENT)
Dept: GENERAL RADIOLOGY | Facility: HOSPITAL | Age: 29
End: 2019-03-22

## 2019-03-22 ENCOUNTER — HOSPITAL ENCOUNTER (EMERGENCY)
Facility: HOSPITAL | Age: 29
Discharge: HOME | End: 2019-03-22
Attending: STUDENT IN AN ORGANIZED HEALTH CARE EDUCATION/TRAINING PROGRAM | Admitting: EMERGENCY MEDICINE

## 2019-03-22 VITALS
WEIGHT: 264 LBS | SYSTOLIC BLOOD PRESSURE: 126 MMHG | TEMPERATURE: 97.4 F | HEART RATE: 105 BPM | OXYGEN SATURATION: 97 % | RESPIRATION RATE: 22 BRPM | DIASTOLIC BLOOD PRESSURE: 86 MMHG | BODY MASS INDEX: 35.76 KG/M2 | HEIGHT: 72 IN

## 2019-03-22 DIAGNOSIS — M54.40 ACUTE BACK PAIN WITH SCIATICA, UNSPECIFIED LATERALITY: Primary | ICD-10-CM

## 2019-03-22 PROCEDURE — 72100 X-RAY EXAM L-S SPINE 2/3 VWS: CPT

## 2019-03-22 PROCEDURE — 63710000001 PREDNISONE PER 5 MG: Performed by: PHYSICIAN ASSISTANT

## 2019-03-22 PROCEDURE — 99283 EMERGENCY DEPT VISIT LOW MDM: CPT

## 2019-03-22 RX ORDER — PREDNISONE 20 MG/1
20 TABLET ORAL 3 TIMES DAILY
Qty: 15 TABLET | Refills: 0 | Status: SHIPPED | OUTPATIENT
Start: 2019-03-22 | End: 2019-03-27

## 2019-03-22 RX ORDER — IBUPROFEN 800 MG/1
800 TABLET ORAL EVERY 8 HOURS PRN
Qty: 90 TABLET | Refills: 0 | Status: SHIPPED | OUTPATIENT
Start: 2019-03-22 | End: 2020-09-17

## 2019-03-22 RX ORDER — IBUPROFEN 800 MG/1
800 TABLET ORAL ONCE
Status: COMPLETED | OUTPATIENT
Start: 2019-03-22 | End: 2019-03-22

## 2019-03-22 RX ORDER — ORPHENADRINE CITRATE 100 MG/1
100 TABLET, EXTENDED RELEASE ORAL 2 TIMES DAILY
Qty: 20 TABLET | Refills: 0 | Status: SHIPPED | OUTPATIENT
Start: 2019-03-22 | End: 2020-09-17

## 2019-03-22 RX ORDER — ORPHENADRINE CITRATE 100 MG/1
100 TABLET, EXTENDED RELEASE ORAL ONCE
Status: COMPLETED | OUTPATIENT
Start: 2019-03-22 | End: 2019-03-22

## 2019-03-22 RX ADMIN — ORPHENADRINE CITRATE 100 MG: 100 TABLET, EXTENDED RELEASE ORAL at 22:00

## 2019-03-22 RX ADMIN — IBUPROFEN 800 MG: 800 TABLET ORAL at 22:00

## 2019-03-22 RX ADMIN — PREDNISONE 60 MG: 50 TABLET ORAL at 22:00

## 2020-04-06 ENCOUNTER — HOSPITAL ENCOUNTER (EMERGENCY)
Facility: HOSPITAL | Age: 30
Discharge: HOME | End: 2020-04-06
Attending: EMERGENCY MEDICINE | Admitting: EMERGENCY MEDICINE

## 2020-04-06 ENCOUNTER — APPOINTMENT (OUTPATIENT)
Dept: GENERAL RADIOLOGY | Facility: HOSPITAL | Age: 30
End: 2020-04-06

## 2020-04-06 VITALS
SYSTOLIC BLOOD PRESSURE: 119 MMHG | HEIGHT: 72 IN | TEMPERATURE: 98.1 F | WEIGHT: 220 LBS | BODY MASS INDEX: 29.8 KG/M2 | OXYGEN SATURATION: 97 % | HEART RATE: 67 BPM | DIASTOLIC BLOOD PRESSURE: 76 MMHG | RESPIRATION RATE: 18 BRPM

## 2020-04-06 DIAGNOSIS — M79.672 LEFT FOOT PAIN: Primary | ICD-10-CM

## 2020-04-06 PROCEDURE — 99283 EMERGENCY DEPT VISIT LOW MDM: CPT

## 2020-04-06 PROCEDURE — 73630 X-RAY EXAM OF FOOT: CPT

## 2020-04-06 RX ORDER — MELOXICAM 7.5 MG/1
7.5 TABLET ORAL DAILY
Status: DISCONTINUED | OUTPATIENT
Start: 2020-04-06 | End: 2020-04-06 | Stop reason: HOSPADM

## 2020-04-06 RX ADMIN — MELOXICAM 7.5 MG: 7.5 TABLET ORAL at 17:13

## 2020-04-06 NOTE — ED PROVIDER NOTES
Subjective   History of Present Illness  Is a 29-year-old male who comes in today complaining of left foot pain.  He reports he was injured at work on Friday and was seen by [facility] occupational medicine Workmen's [employer].  He states that they x-rayed his foot and send him back to work with restrictions.  He states he has been unable to go back to work today and went back to [facility] occupational medicine and he reports they would not keep him off of work so he is here today for a work excuse.  He also reports that his foot is continued to hurt and would like for us to re-x-ray it.  Review of Systems   Constitutional: Negative.    HENT: Negative.    Eyes: Negative.    Respiratory: Negative.    Cardiovascular: Negative.    Gastrointestinal: Negative.    Endocrine: Negative.    Genitourinary: Negative.    Musculoskeletal: Positive for arthralgias.   Skin: Negative.    Allergic/Immunologic: Negative.    Neurological: Negative.    Hematological: Negative.    Psychiatric/Behavioral: Negative.        Past Medical History:   Diagnosis Date   • Back pain    • Depression    • Seizures (CMS/HCC)        No Known Allergies    Past Surgical History:   Procedure Laterality Date   • HAND SURGERY     • SEPTOPLASTY         Family History   Problem Relation Age of Onset   • Stroke Father    • Heart attack Father    • Hypertension Father        Social History     Socioeconomic History   • Marital status: Single     Spouse name: Not on file   • Number of children: Not on file   • Years of education: Not on file   • Highest education level: Not on file   Tobacco Use   • Smoking status: Current Every Day Smoker     Packs/day: 0.50   • Smokeless tobacco: Never Used   Substance and Sexual Activity   • Alcohol use: No   • Drug use: No   • Sexual activity: Yes     Partners: Female           Objective   Physical Exam   Constitutional: He appears well-developed and well-nourished.   Nursing note and vitals reviewed.  GEN: No acute  distress  Head: Normocephalic, atraumatic  Eyes: Pupils equal round reactive to light  ENT: Posterior pharynx normal in appearance, oral mucosa is moist  Chest: Nontender to palpation  Cardiovascular: Regular rate  Lungs: Clear to auscultation bilaterally  Abdomen: Soft, nontender, nondistended, no peritoneal signs  Extremities: Bruising to the top of his left foot, tender to touch.  Neuro: GCS 15  Psych: Mood and affect are appropriate      Procedures           ED Course  ED Course as of Apr 06 1808 Mon Apr 06, 2020 1759   I have discussed the findings with the patient. I have advised him to follow up with his workman's comp provider for further work instructions. I have given him return to care instructions and he is agreeable to this plan of care    [TW]      ED Course User Index  [TW] Luz Urbano APRN                                           MDM  Number of Diagnoses or Management Options  Diagnosis management comments: I advised him that we cannot change his Workmen's Comp. restrictions.  He would have to follow back up in their office.  We will re-x-ray his foot today since he reports he is having so much trouble with it.       Amount and/or Complexity of Data Reviewed  Review and summarize past medical records: yes  Discuss the patient with other providers: yes  Independent visualization of images, tracings, or specimens: yes    Risk of Complications, Morbidity, and/or Mortality  Presenting problems: minimal  Diagnostic procedures: low  Management options: low        Final diagnoses:   Left foot pain            Luz Urbano APRN  04/06/20 0288

## 2020-09-17 ENCOUNTER — HOSPITAL ENCOUNTER (EMERGENCY)
Facility: HOSPITAL | Age: 30
Discharge: HOME | End: 2020-09-17
Attending: STUDENT IN AN ORGANIZED HEALTH CARE EDUCATION/TRAINING PROGRAM | Admitting: STUDENT IN AN ORGANIZED HEALTH CARE EDUCATION/TRAINING PROGRAM

## 2020-09-17 VITALS
HEIGHT: 71 IN | WEIGHT: 248 LBS | HEART RATE: 91 BPM | RESPIRATION RATE: 16 BRPM | BODY MASS INDEX: 34.72 KG/M2 | SYSTOLIC BLOOD PRESSURE: 141 MMHG | TEMPERATURE: 98.2 F | DIASTOLIC BLOOD PRESSURE: 97 MMHG | OXYGEN SATURATION: 99 %

## 2020-09-17 DIAGNOSIS — S61.412A LACERATION OF LEFT HAND WITHOUT FOREIGN BODY, INITIAL ENCOUNTER: Primary | ICD-10-CM

## 2020-09-17 PROCEDURE — 99282 EMERGENCY DEPT VISIT SF MDM: CPT

## 2020-09-17 RX ORDER — LIDOCAINE HYDROCHLORIDE AND EPINEPHRINE BITARTRATE 20; .01 MG/ML; MG/ML
10 INJECTION, SOLUTION SUBCUTANEOUS ONCE
Status: COMPLETED | OUTPATIENT
Start: 2020-09-17 | End: 2020-09-17

## 2020-09-17 RX ORDER — CEPHALEXIN 500 MG/1
500 CAPSULE ORAL 4 TIMES DAILY
Qty: 20 CAPSULE | Refills: 0 | OUTPATIENT
Start: 2020-09-17 | End: 2021-12-30

## 2020-09-17 RX ORDER — NAPROXEN 500 MG/1
500 TABLET ORAL 2 TIMES DAILY WITH MEALS
Qty: 20 TABLET | Refills: 0 | OUTPATIENT
Start: 2020-09-17 | End: 2021-12-30

## 2020-09-17 RX ADMIN — LIDOCAINE HYDROCHLORIDE,EPINEPHRINE BITARTRATE 10 ML: 20; .01 INJECTION, SOLUTION INFILTRATION; PERINEURAL at 08:07

## 2020-09-17 NOTE — ED PROVIDER NOTES
Subjective   30-year-old male just prior to arrival struck the back of his left hand on a nail that was sticking out of a wall lacerating his hand.  Bleeding is currently controlled.  States pain is moderate, constant, and burning.  Nothing makes it better or worse.  Patient states his last tetanus shot was approximately 2 years ago          Review of Systems   All other systems reviewed and are negative.      Past Medical History:   Diagnosis Date   • Back pain    • Depression    • Seizures (CMS/HCC)        No Known Allergies    Past Surgical History:   Procedure Laterality Date   • AMPUTATION FINGER / THUMB Left     3rd, 4th, and 5th digits    • HAND SURGERY     • SEPTOPLASTY         Family History   Problem Relation Age of Onset   • Stroke Father    • Heart attack Father    • Hypertension Father        Social History     Socioeconomic History   • Marital status: Single     Spouse name: Not on file   • Number of children: Not on file   • Years of education: Not on file   • Highest education level: Not on file   Tobacco Use   • Smoking status: Current Every Day Smoker     Packs/day: 0.50   • Smokeless tobacco: Never Used   Substance and Sexual Activity   • Alcohol use: No   • Drug use: No   • Sexual activity: Yes     Partners: Female           Objective   Physical Exam  Vitals signs and nursing note reviewed.         GEN: No acute distress  Head: Normocephalic, atraumatic  Eyes: Pupils equal round reactive to light  ENT: Posterior pharynx normal in appearance, oral mucosa is moist  Chest: Nontender to palpation  Cardiovascular: Regular rate  Lungs: Clear to auscultation bilaterally  Abdomen: Soft, nontender, nondistended, no peritoneal signs  Extremities: Left hand has a laceration is approximately 4.5 cm on the dorsum starting in between the second and third MCP joints moving proximally.  Full range of motion of all fingers.  Patient does have healed surgical amputations of the distal long, ring, and small fingers  on his hand.  Neuro: GCS 15  Psych: Mood and affect are appropriate      Laceration Repair    Date/Time: 9/17/2020 8:26 AM  Performed by: Ronnie Roberts MD  Authorized by: Ronnie Roberts MD     Consent:     Consent obtained:  Verbal    Consent given by:  Patient    Risks discussed:  Infection and pain  Anesthesia (see MAR for exact dosages):     Anesthesia method:  Local infiltration    Local anesthetic:  Lidocaine 2% WITH epi  Laceration details:     Location:  Hand    Hand location:  L hand, dorsum    Length (cm):  4.5  Repair type:     Repair type:  Simple  Pre-procedure details:     Preparation:  Patient was prepped and draped in usual sterile fashion  Exploration:     Wound exploration: wound explored through full range of motion and entire depth of wound probed and visualized      Contaminated: no    Treatment:     Area cleansed with:  Hibiclens    Amount of cleaning:  Standard    Irrigation solution:  Tap water  Skin repair:     Repair method:  Sutures    Suture size:  4-0    Suture material:  Nylon    Suture technique:  Simple interrupted    Number of sutures:  9  Approximation:     Approximation:  Close  Post-procedure details:     Dressing:  Non-adherent dressing    Patient tolerance of procedure:  Tolerated well, no immediate complications               ED Course                                           MDM  Number of Diagnoses or Management Options  Laceration of left hand without foreign body, initial encounter:   Diagnosis management comments: Due to this being male we will give the patient prophylactic antibiotics.  Will be given nonsteroidal anti-inflammatory drugs for pain control.  Gave him my usual wound care instructions.  Will need sutures removed in 7 to 10 days.       Amount and/or Complexity of Data Reviewed  Decide to obtain previous medical records or to obtain history from someone other than the patient: yes  Review and summarize past medical records: yes        Final diagnoses:    Laceration of left hand without foreign body, initial encounter            Ronnie Roberts MD  09/17/20 0806

## 2021-07-06 ENCOUNTER — HOSPITAL ENCOUNTER (EMERGENCY)
Facility: HOSPITAL | Age: 31
Discharge: HOME | End: 2021-07-06
Attending: EMERGENCY MEDICINE | Admitting: EMERGENCY MEDICINE

## 2021-07-06 ENCOUNTER — APPOINTMENT (OUTPATIENT)
Dept: GENERAL RADIOLOGY | Facility: HOSPITAL | Age: 31
End: 2021-07-06

## 2021-07-06 VITALS
HEIGHT: 72 IN | WEIGHT: 238.4 LBS | SYSTOLIC BLOOD PRESSURE: 117 MMHG | DIASTOLIC BLOOD PRESSURE: 82 MMHG | RESPIRATION RATE: 18 BRPM | OXYGEN SATURATION: 97 % | HEART RATE: 62 BPM | TEMPERATURE: 99.9 F | BODY MASS INDEX: 32.29 KG/M2

## 2021-07-06 DIAGNOSIS — R05.9 COUGH: Primary | ICD-10-CM

## 2021-07-06 DIAGNOSIS — R06.02 SHORTNESS OF BREATH: ICD-10-CM

## 2021-07-06 LAB
ALBUMIN SERPL-MCNC: 4.6 G/DL (ref 3.5–5.2)
ALBUMIN/GLOB SERPL: 1.7 G/DL
ALP SERPL-CCNC: 118 U/L (ref 39–117)
ALT SERPL W P-5'-P-CCNC: 16 U/L (ref 1–41)
ANION GAP SERPL CALCULATED.3IONS-SCNC: 11.5 MMOL/L (ref 5–15)
AST SERPL-CCNC: 18 U/L (ref 1–40)
BASOPHILS # BLD AUTO: 0.07 10*3/MM3 (ref 0–0.2)
BASOPHILS NFR BLD AUTO: 0.9 % (ref 0–1.5)
BILIRUB SERPL-MCNC: 0.4 MG/DL (ref 0–1.2)
BUN SERPL-MCNC: 13 MG/DL (ref 6–20)
BUN/CREAT SERPL: 15.5 (ref 7–25)
CALCIUM SPEC-SCNC: 9.2 MG/DL (ref 8.6–10.5)
CHLORIDE SERPL-SCNC: 106 MMOL/L (ref 98–107)
CO2 SERPL-SCNC: 27.5 MMOL/L (ref 22–29)
CREAT SERPL-MCNC: 0.84 MG/DL (ref 0.76–1.27)
D DIMER PPP FEU-MCNC: 0.27 MCGFEU/ML (ref 0–0.57)
DEPRECATED RDW RBC AUTO: 40.6 FL (ref 37–54)
EOSINOPHIL # BLD AUTO: 0.42 10*3/MM3 (ref 0–0.4)
EOSINOPHIL NFR BLD AUTO: 5.4 % (ref 0.3–6.2)
ERYTHROCYTE [DISTWIDTH] IN BLOOD BY AUTOMATED COUNT: 11.9 % (ref 12.3–15.4)
GFR SERPL CREATININE-BSD FRML MDRD: 106 ML/MIN/1.73
GLOBULIN UR ELPH-MCNC: 2.7 GM/DL
GLUCOSE SERPL-MCNC: 105 MG/DL (ref 65–99)
HCT VFR BLD AUTO: 48.7 % (ref 37.5–51)
HGB BLD-MCNC: 16.5 G/DL (ref 13–17.7)
IMM GRANULOCYTES # BLD AUTO: 0.02 10*3/MM3 (ref 0–0.05)
IMM GRANULOCYTES NFR BLD AUTO: 0.3 % (ref 0–0.5)
LYMPHOCYTES # BLD AUTO: 1.91 10*3/MM3 (ref 0.7–3.1)
LYMPHOCYTES NFR BLD AUTO: 24.6 % (ref 19.6–45.3)
MCH RBC QN AUTO: 31.4 PG (ref 26.6–33)
MCHC RBC AUTO-ENTMCNC: 33.9 G/DL (ref 31.5–35.7)
MCV RBC AUTO: 92.6 FL (ref 79–97)
MONOCYTES # BLD AUTO: 0.97 10*3/MM3 (ref 0.1–0.9)
MONOCYTES NFR BLD AUTO: 12.5 % (ref 5–12)
NEUTROPHILS NFR BLD AUTO: 4.36 10*3/MM3 (ref 1.7–7)
NEUTROPHILS NFR BLD AUTO: 56.3 % (ref 42.7–76)
NRBC BLD AUTO-RTO: 0 /100 WBC (ref 0–0.2)
NT-PROBNP SERPL-MCNC: 27.7 PG/ML (ref 0–450)
PLATELET # BLD AUTO: 195 10*3/MM3 (ref 140–450)
PMV BLD AUTO: 9 FL (ref 6–12)
POTASSIUM SERPL-SCNC: 4.1 MMOL/L (ref 3.5–5.2)
PROT SERPL-MCNC: 7.3 G/DL (ref 6–8.5)
RBC # BLD AUTO: 5.26 10*6/MM3 (ref 4.14–5.8)
SARS-COV-2 RNA PNL SPEC NAA+PROBE: NOT DETECTED
SODIUM SERPL-SCNC: 145 MMOL/L (ref 136–145)
TROPONIN T SERPL-MCNC: <0.01 NG/ML (ref 0–0.03)
WBC # BLD AUTO: 7.75 10*3/MM3 (ref 3.4–10.8)

## 2021-07-06 PROCEDURE — 83880 ASSAY OF NATRIURETIC PEPTIDE: CPT | Performed by: EMERGENCY MEDICINE

## 2021-07-06 PROCEDURE — 87635 SARS-COV-2 COVID-19 AMP PRB: CPT | Performed by: EMERGENCY MEDICINE

## 2021-07-06 PROCEDURE — 85025 COMPLETE CBC W/AUTO DIFF WBC: CPT | Performed by: EMERGENCY MEDICINE

## 2021-07-06 PROCEDURE — 93005 ELECTROCARDIOGRAM TRACING: CPT | Performed by: EMERGENCY MEDICINE

## 2021-07-06 PROCEDURE — 85379 FIBRIN DEGRADATION QUANT: CPT | Performed by: EMERGENCY MEDICINE

## 2021-07-06 PROCEDURE — 99283 EMERGENCY DEPT VISIT LOW MDM: CPT

## 2021-07-06 PROCEDURE — 25010000002 KETOROLAC TROMETHAMINE PER 15 MG: Performed by: EMERGENCY MEDICINE

## 2021-07-06 PROCEDURE — 80053 COMPREHEN METABOLIC PANEL: CPT | Performed by: EMERGENCY MEDICINE

## 2021-07-06 PROCEDURE — 84484 ASSAY OF TROPONIN QUANT: CPT | Performed by: EMERGENCY MEDICINE

## 2021-07-06 PROCEDURE — 96374 THER/PROPH/DIAG INJ IV PUSH: CPT

## 2021-07-06 PROCEDURE — 71046 X-RAY EXAM CHEST 2 VIEWS: CPT

## 2021-07-06 RX ORDER — KETOROLAC TROMETHAMINE 30 MG/ML
15 INJECTION, SOLUTION INTRAMUSCULAR; INTRAVENOUS ONCE
Status: COMPLETED | OUTPATIENT
Start: 2021-07-06 | End: 2021-07-06

## 2021-07-06 RX ADMIN — SODIUM CHLORIDE 1000 ML: 9 INJECTION, SOLUTION INTRAVENOUS at 10:37

## 2021-07-06 RX ADMIN — KETOROLAC TROMETHAMINE 15 MG: 30 INJECTION, SOLUTION INTRAMUSCULAR; INTRAVENOUS at 10:45

## 2021-07-06 NOTE — ED PROVIDER NOTES
Subjective   31-year-old male presenting with shortness of breath.  He states that for the last several days he has had shortness of breath, chest pressure, subjective fevers and chills, body aches.  No alleviating or aggravating factors to the symptoms.  He denies any cough, vomiting, abdominal pain.  No sick contacts that he knows of other than his youngest son who had cough recently.  He does smoke.          Review of Systems   Constitutional: Positive for fever.   HENT: Positive for congestion.    Eyes: Negative.    Respiratory: Positive for shortness of breath. Negative for cough.    Cardiovascular: Positive for chest pain.   Gastrointestinal: Negative.    Genitourinary: Negative.    Musculoskeletal: Positive for arthralgias.   Skin: Negative.    Neurological: Negative.    Psychiatric/Behavioral: Negative.        Past Medical History:   Diagnosis Date   • Back pain    • Depression    • Seizures (CMS/HCC)        No Known Allergies    Past Surgical History:   Procedure Laterality Date   • AMPUTATION FINGER / THUMB Left     3rd, 4th, and 5th digits    • HAND SURGERY     • SEPTOPLASTY         Family History   Problem Relation Age of Onset   • Stroke Father    • Heart attack Father    • Hypertension Father        Social History     Socioeconomic History   • Marital status: Single     Spouse name: Not on file   • Number of children: Not on file   • Years of education: Not on file   • Highest education level: Not on file   Tobacco Use   • Smoking status: Current Every Day Smoker     Packs/day: 0.50   • Smokeless tobacco: Never Used   Vaping Use   • Vaping Use: Every day   • Substances: Nicotine   • Devices: Disposable   Substance and Sexual Activity   • Alcohol use: No   • Drug use: No   • Sexual activity: Yes     Partners: Female           Objective   Physical Exam  Vitals reviewed.   Constitutional:       General: He is not in acute distress.     Appearance: Normal appearance. He is not ill-appearing,  toxic-appearing or diaphoretic.   HENT:      Head: Normocephalic and atraumatic.      Right Ear: External ear normal.      Left Ear: External ear normal.      Nose: Nose normal.      Mouth/Throat:      Mouth: Mucous membranes are moist.      Pharynx: Oropharynx is clear.   Eyes:      Extraocular Movements: Extraocular movements intact.      Conjunctiva/sclera: Conjunctivae normal.      Pupils: Pupils are equal, round, and reactive to light.   Cardiovascular:      Rate and Rhythm: Normal rate and regular rhythm.      Pulses: Normal pulses.      Heart sounds: Normal heart sounds.   Pulmonary:      Effort: Pulmonary effort is normal. No respiratory distress.      Breath sounds: Normal breath sounds.   Abdominal:      General: Bowel sounds are normal. There is no distension.      Tenderness: There is no abdominal tenderness.   Musculoskeletal:         General: No swelling, tenderness or deformity. Normal range of motion.      Cervical back: Normal range of motion and neck supple.   Skin:     General: Skin is warm and dry.      Capillary Refill: Capillary refill takes less than 2 seconds.      Findings: No rash.   Neurological:      General: No focal deficit present.      Mental Status: He is alert and oriented to person, place, and time.   Psychiatric:         Mood and Affect: Mood normal.         Behavior: Behavior normal.         Procedures           ED Course                                           MDM  Number of Diagnoses or Management Options  Cough  Shortness of breath  Diagnosis management comments: 31-year-old male with multiple complaints.  Well-developed, well-nourished young man in no distress with exam as above.  His vital signs are normal.  His exam is otherwise as above.  History sounds most consistent with viral illness.  Will obtain labs to include D-dimer.  Will get Covid swab.  Will check chest imaging pending D-dimer results.  Will give symptomatic treatment.  Disposition pending anticipate  discharge home.    DDx: Viral illness, URI, pneumonia, PE    EKG interpreted by me: sinus rhythm, normal rate, no acute ST/T changes, low voltage QRS complexes, this is an atypical     Work up here is unremarkable. He feels well. Will discharge home with supportive measures and outpatient follow up. He is happy with this plan.      Final diagnoses:   Cough   Shortness of breath          Joaquín Barros MD  07/06/21 2412

## 2022-09-06 ENCOUNTER — HOSPITAL ENCOUNTER (EMERGENCY)
Facility: HOSPITAL | Age: 32
Discharge: LEFT BEFORE DISCHARGE | End: 2022-09-06

## 2022-09-06 VITALS
WEIGHT: 220 LBS | HEIGHT: 72 IN | RESPIRATION RATE: 16 BRPM | BODY MASS INDEX: 29.8 KG/M2 | DIASTOLIC BLOOD PRESSURE: 81 MMHG | TEMPERATURE: 99.3 F | SYSTOLIC BLOOD PRESSURE: 111 MMHG | OXYGEN SATURATION: 98 % | HEART RATE: 70 BPM

## 2022-09-06 PROCEDURE — 99211 OFF/OP EST MAY X REQ PHY/QHP: CPT

## 2022-09-08 ENCOUNTER — APPOINTMENT (OUTPATIENT)
Dept: GENERAL RADIOLOGY | Facility: HOSPITAL | Age: 32
End: 2022-09-08

## 2022-09-08 ENCOUNTER — APPOINTMENT (OUTPATIENT)
Dept: CT IMAGING | Facility: HOSPITAL | Age: 32
End: 2022-09-08

## 2022-09-08 ENCOUNTER — HOSPITAL ENCOUNTER (EMERGENCY)
Facility: HOSPITAL | Age: 32
Discharge: HOME | End: 2022-09-08
Attending: EMERGENCY MEDICINE | Admitting: EMERGENCY MEDICINE

## 2022-09-08 VITALS
WEIGHT: 220 LBS | HEIGHT: 72 IN | DIASTOLIC BLOOD PRESSURE: 91 MMHG | SYSTOLIC BLOOD PRESSURE: 108 MMHG | HEART RATE: 70 BPM | RESPIRATION RATE: 16 BRPM | OXYGEN SATURATION: 95 % | TEMPERATURE: 97.8 F | BODY MASS INDEX: 29.8 KG/M2

## 2022-09-08 DIAGNOSIS — R51.9 NONINTRACTABLE HEADACHE, UNSPECIFIED CHRONICITY PATTERN, UNSPECIFIED HEADACHE TYPE: ICD-10-CM

## 2022-09-08 DIAGNOSIS — R42 DIZZINESS ON STANDING: Primary | ICD-10-CM

## 2022-09-08 LAB
ALBUMIN SERPL-MCNC: 4.4 G/DL (ref 3.5–5.2)
ALBUMIN/GLOB SERPL: 1.9 G/DL
ALP SERPL-CCNC: 118 U/L (ref 39–117)
ALT SERPL W P-5'-P-CCNC: 22 U/L (ref 1–41)
ANION GAP SERPL CALCULATED.3IONS-SCNC: 9.8 MMOL/L (ref 5–15)
AST SERPL-CCNC: 20 U/L (ref 1–40)
BASOPHILS # BLD AUTO: 0.09 10*3/MM3 (ref 0–0.2)
BASOPHILS NFR BLD AUTO: 1 % (ref 0–1.5)
BILIRUB SERPL-MCNC: 0.5 MG/DL (ref 0–1.2)
BILIRUB UR QL STRIP: NEGATIVE
BUN SERPL-MCNC: 15 MG/DL (ref 6–20)
BUN/CREAT SERPL: 17.4 (ref 7–25)
CALCIUM SPEC-SCNC: 9.5 MG/DL (ref 8.6–10.5)
CHLORIDE SERPL-SCNC: 103 MMOL/L (ref 98–107)
CLARITY UR: CLEAR
CO2 SERPL-SCNC: 27.2 MMOL/L (ref 22–29)
COLOR UR: YELLOW
CREAT SERPL-MCNC: 0.86 MG/DL (ref 0.76–1.27)
DEPRECATED RDW RBC AUTO: 38.2 FL (ref 37–54)
EGFRCR SERPLBLD CKD-EPI 2021: 117.3 ML/MIN/1.73
EOSINOPHIL # BLD AUTO: 0.29 10*3/MM3 (ref 0–0.4)
EOSINOPHIL NFR BLD AUTO: 3.2 % (ref 0.3–6.2)
ERYTHROCYTE [DISTWIDTH] IN BLOOD BY AUTOMATED COUNT: 11.9 % (ref 12.3–15.4)
GLOBULIN UR ELPH-MCNC: 2.3 GM/DL
GLUCOSE SERPL-MCNC: 102 MG/DL (ref 65–99)
GLUCOSE UR STRIP-MCNC: NEGATIVE MG/DL
HCT VFR BLD AUTO: 46 % (ref 37.5–51)
HGB BLD-MCNC: 16.1 G/DL (ref 13–17.7)
HGB UR QL STRIP.AUTO: NEGATIVE
HOLD SPECIMEN: NORMAL
HOLD SPECIMEN: NORMAL
IMM GRANULOCYTES # BLD AUTO: 0.03 10*3/MM3 (ref 0–0.05)
IMM GRANULOCYTES NFR BLD AUTO: 0.3 % (ref 0–0.5)
KETONES UR QL STRIP: NEGATIVE
LEUKOCYTE ESTERASE UR QL STRIP.AUTO: NEGATIVE
LYMPHOCYTES # BLD AUTO: 3.31 10*3/MM3 (ref 0.7–3.1)
LYMPHOCYTES NFR BLD AUTO: 36.8 % (ref 19.6–45.3)
MAGNESIUM SERPL-MCNC: 1.8 MG/DL (ref 1.6–2.6)
MCH RBC QN AUTO: 30.4 PG (ref 26.6–33)
MCHC RBC AUTO-ENTMCNC: 35 G/DL (ref 31.5–35.7)
MCV RBC AUTO: 87 FL (ref 79–97)
MONOCYTES # BLD AUTO: 0.54 10*3/MM3 (ref 0.1–0.9)
MONOCYTES NFR BLD AUTO: 6 % (ref 5–12)
NEUTROPHILS NFR BLD AUTO: 4.73 10*3/MM3 (ref 1.7–7)
NEUTROPHILS NFR BLD AUTO: 52.7 % (ref 42.7–76)
NITRITE UR QL STRIP: NEGATIVE
NRBC BLD AUTO-RTO: 0 /100 WBC (ref 0–0.2)
PH UR STRIP.AUTO: 7.5 [PH] (ref 5–8)
PLATELET # BLD AUTO: 231 10*3/MM3 (ref 140–450)
PMV BLD AUTO: 8.2 FL (ref 6–12)
POTASSIUM SERPL-SCNC: 4 MMOL/L (ref 3.5–5.2)
PROT SERPL-MCNC: 6.7 G/DL (ref 6–8.5)
PROT UR QL STRIP: NEGATIVE
RBC # BLD AUTO: 5.29 10*6/MM3 (ref 4.14–5.8)
SODIUM SERPL-SCNC: 140 MMOL/L (ref 136–145)
SP GR UR STRIP: 1.02 (ref 1–1.03)
TROPONIN T SERPL-MCNC: <0.01 NG/ML (ref 0–0.03)
UROBILINOGEN UR QL STRIP: NORMAL
WBC NRBC COR # BLD: 8.99 10*3/MM3 (ref 3.4–10.8)
WHOLE BLOOD HOLD COAG: NORMAL
WHOLE BLOOD HOLD SPECIMEN: NORMAL

## 2022-09-08 PROCEDURE — 84484 ASSAY OF TROPONIN QUANT: CPT | Performed by: EMERGENCY MEDICINE

## 2022-09-08 PROCEDURE — 25010000002 DIPHENHYDRAMINE PER 50 MG: Performed by: PHYSICIAN ASSISTANT

## 2022-09-08 PROCEDURE — 71045 X-RAY EXAM CHEST 1 VIEW: CPT

## 2022-09-08 PROCEDURE — 25010000002 KETOROLAC TROMETHAMINE PER 15 MG: Performed by: PHYSICIAN ASSISTANT

## 2022-09-08 PROCEDURE — 83735 ASSAY OF MAGNESIUM: CPT | Performed by: EMERGENCY MEDICINE

## 2022-09-08 PROCEDURE — 85025 COMPLETE CBC W/AUTO DIFF WBC: CPT | Performed by: EMERGENCY MEDICINE

## 2022-09-08 PROCEDURE — 96375 TX/PRO/DX INJ NEW DRUG ADDON: CPT

## 2022-09-08 PROCEDURE — 80053 COMPREHEN METABOLIC PANEL: CPT | Performed by: EMERGENCY MEDICINE

## 2022-09-08 PROCEDURE — 25010000002 METOCLOPRAMIDE PER 10 MG: Performed by: PHYSICIAN ASSISTANT

## 2022-09-08 PROCEDURE — 99284 EMERGENCY DEPT VISIT MOD MDM: CPT

## 2022-09-08 PROCEDURE — 96374 THER/PROPH/DIAG INJ IV PUSH: CPT

## 2022-09-08 PROCEDURE — 70450 CT HEAD/BRAIN W/O DYE: CPT

## 2022-09-08 PROCEDURE — 93005 ELECTROCARDIOGRAM TRACING: CPT | Performed by: EMERGENCY MEDICINE

## 2022-09-08 PROCEDURE — 81003 URINALYSIS AUTO W/O SCOPE: CPT | Performed by: EMERGENCY MEDICINE

## 2022-09-08 RX ORDER — DIPHENHYDRAMINE HYDROCHLORIDE 50 MG/ML
25 INJECTION INTRAMUSCULAR; INTRAVENOUS ONCE
Status: COMPLETED | OUTPATIENT
Start: 2022-09-08 | End: 2022-09-08

## 2022-09-08 RX ORDER — ONDANSETRON 4 MG/1
4 TABLET, ORALLY DISINTEGRATING ORAL EVERY 6 HOURS PRN
Qty: 8 TABLET | Refills: 0 | Status: SHIPPED | OUTPATIENT
Start: 2022-09-08 | End: 2022-09-10

## 2022-09-08 RX ORDER — SODIUM CHLORIDE 0.9 % (FLUSH) 0.9 %
10 SYRINGE (ML) INJECTION AS NEEDED
Status: DISCONTINUED | OUTPATIENT
Start: 2022-09-08 | End: 2022-09-08

## 2022-09-08 RX ORDER — SODIUM CHLORIDE 0.9 % (FLUSH) 0.9 %
10 SYRINGE (ML) INJECTION AS NEEDED
Status: DISCONTINUED | OUTPATIENT
Start: 2022-09-08 | End: 2022-09-08 | Stop reason: HOSPADM

## 2022-09-08 RX ORDER — MECLIZINE HYDROCHLORIDE 25 MG/1
25 TABLET ORAL ONCE
Status: COMPLETED | OUTPATIENT
Start: 2022-09-08 | End: 2022-09-08

## 2022-09-08 RX ORDER — MECLIZINE HYDROCHLORIDE 25 MG/1
25 TABLET ORAL 3 TIMES DAILY PRN
Qty: 9 TABLET | Refills: 0 | Status: SHIPPED | OUTPATIENT
Start: 2022-09-08 | End: 2022-09-11

## 2022-09-08 RX ORDER — METOCLOPRAMIDE HYDROCHLORIDE 5 MG/ML
10 INJECTION INTRAMUSCULAR; INTRAVENOUS ONCE
Status: COMPLETED | OUTPATIENT
Start: 2022-09-08 | End: 2022-09-08

## 2022-09-08 RX ORDER — KETOROLAC TROMETHAMINE 30 MG/ML
30 INJECTION, SOLUTION INTRAMUSCULAR; INTRAVENOUS ONCE
Status: COMPLETED | OUTPATIENT
Start: 2022-09-08 | End: 2022-09-08

## 2022-09-08 RX ADMIN — DIPHENHYDRAMINE HYDROCHLORIDE 25 MG: 50 INJECTION INTRAMUSCULAR; INTRAVENOUS at 10:05

## 2022-09-08 RX ADMIN — SODIUM CHLORIDE 1000 ML: 9 INJECTION, SOLUTION INTRAVENOUS at 09:08

## 2022-09-08 RX ADMIN — METOCLOPRAMIDE 10 MG: 5 INJECTION, SOLUTION INTRAMUSCULAR; INTRAVENOUS at 10:05

## 2022-09-08 RX ADMIN — KETOROLAC TROMETHAMINE 30 MG: 30 INJECTION, SOLUTION INTRAMUSCULAR; INTRAVENOUS at 10:05

## 2022-09-08 RX ADMIN — MECLIZINE HYDROCHLORIDE 25 MG: 25 TABLET ORAL at 10:05

## 2022-09-08 NOTE — ED PROVIDER NOTES
"Subjective   PIT    Patient is a 29-year-old male with history of back pain, depression and seizures presenting to the ER for evaluation of dizziness and headache.  Patient states his symptoms began Sunday, 9/4/2022.  He states he is got a sharp pain in the right side of his head that is worse when he stands.  He states he also feels dizzy upon standing \"like my head is in the clouds\".  He states he has had headaches like this in the past but they usually do not last this long.  He states he normally takes ibuprofen and Tylenol at home for symptoms.  He denies any fever, chills, neck pain or stiffness, vision loss or changes, ear pain, throat pain, difficulty swallowing, chest pain, shortness of breath, syncopal episodes, extremity weakness or paresthesias, gait abnormalities, or any other symptoms.  Denies any alcohol or recreational drug use.  Denies any falls or head trauma.          Review of Systems   Constitutional: Negative for chills and fever.   HENT: Negative for congestion, ear pain, rhinorrhea and sore throat.    Eyes: Negative.    Respiratory: Negative.    Cardiovascular: Negative.    Gastrointestinal: Negative.    Genitourinary: Negative.    Musculoskeletal: Negative.    Skin: Negative.    Allergic/Immunologic: Negative for immunocompromised state.   Neurological: Positive for dizziness and headaches.   Psychiatric/Behavioral: Negative.        Past Medical History:   Diagnosis Date   • Back pain    • Depression    • Seizures (HCC)        No Known Allergies    Past Surgical History:   Procedure Laterality Date   • AMPUTATION FINGER / THUMB Left     3rd, 4th, and 5th digits    • HAND SURGERY     • SEPTOPLASTY         Family History   Problem Relation Age of Onset   • Stroke Father    • Heart attack Father    • Hypertension Father        Social History     Socioeconomic History   • Marital status: Single   Tobacco Use   • Smoking status: Current Every Day Smoker     Packs/day: 0.50   • Smokeless tobacco: " "Never Used   Vaping Use   • Vaping Use: Former   • Substances: Nicotine   • Devices: Disposable   Substance and Sexual Activity   • Alcohol use: No   • Drug use: No   • Sexual activity: Yes     Partners: Female           Objective   Physical Exam  Vitals and nursing note reviewed.     /91   Pulse 70   Temp 97.8 °F (36.6 °C) (Oral)   Resp 16   Ht 182.9 cm (72\")   Wt 99.8 kg (220 lb)   SpO2 95%   BMI 29.84 kg/m²     GEN: No acute distress, sitting upright in stretcher.  Awake and alert.  Does not appear septic or toxic.  Head: Normocephalic, atraumatic, no tenderness to frontal or maxillary sinuses  Eyes: EOM intact, PERRL  ENT: Posterior pharynx normal in appearance, oral mucosa is moist, tongue midline, tympanic membranes are clear bilaterally  Chest: Nontender to palpation  Cardiovascular: Regular rate and rhythm  Lungs: Clear to auscultation bilaterally without adventitious sounds  Abdomen: Soft, nontender, nondistended, no peritoneal signs, no guarding  Extremities: No edema, normal appearance, full range of motion, strength 5 out of 5 in upper and lower extremities  Neuro: GCS 15, no gross focal neurological deficits  Psych: Mood and affect are appropriate    Procedures           ED Course  ED Course as of 09/08/22 1246   Thu Sep 08, 2022   0904 EKG interpreted by me reveals sinus rhythm rate 77.  Low voltage EKG nonspecific T wave changes.  No ectopy no ischemic changes. [PF]   0905 WBC: 8.99 [LA]   0905 Hemoglobin: 16.1 [LA]   0905 Platelets: 231 [LA]   0932 Troponin T: <0.010 [LA]   0932 Magnesium: 1.8 [LA]   0932 Glucose(!): 102 [LA]   0932 BUN: 15 [LA]   0932 Creatinine: 0.86 [LA]   0932 Sodium: 140 [LA]   0932 Potassium: 4.0 [LA]   0932 CO2: 27.2 [LA]   0932 Calcium: 9.5 [LA]   0932 Total Protein: 6.7 [LA]   0932 Albumin: 4.40 [LA]   0932 ALT (SGPT): 22 [LA]   0932 AST (SGOT): 20 [LA]   0932 Alkaline Phosphatase(!): 118 [LA]   0932 Total Bilirubin: 0.5 [LA]   0932 Globulin: 2.3 [LA]   0932 A/G " Ratio: 1.9 [LA]   0932 BUN/Creatinine Ratio: 17.4 [LA]   0932 Anion Gap: 9.8 [LA]   0932 eGFR: 117.3 [LA]   0933 COMPARISON:   None     FINDINGS:   No acute intracranial hemorrhage or large acute cortical infarct. The  brain volume is normal for the patient's age. Ventricles are normal in  size and configuration. No midline shift. The basal cisterns are patent.     No skull fracture. The visualized paranasal sinuses and mastoid air  cells are clear.     IMPRESSION:  No acute intracranial abnormality.        CRITICAL RESULT:  No.     COMMUNICATION:  Per this written report.     Images personally reviewed, interpreted, and dictated by GIL Aguilar. [LA]   0933 COMPARISON:  Radiographs 07/06/2021.     FINDINGS:  Lungs are clear without evidence of focal airspace consolidation. No  pneumothorax. No pleural effusion. Heart and mediastinal contours are  within normal limits. No acute osseous or soft tissue abnormalities. No  free air under the hemidiaphragms. Prominent perihilar vascular  markings, appears unchanged from prior exam.     IMPRESSION:  No acute cardiopulmonary findings.     Images personally reviewed, interpreted and dictated by GIL Aguilar.                This report was signed and finalized on 9/8/2022 9:17 AM by GIL Aguilar. [LA]   0957 Color, UA: Yellow [LA]   0957 Appearance, UA: Clear [LA]   0957 pH, UA: 7.5 [LA]   0957 Specific Gravity, UA: 1.024 [LA]   0957 Glucose: Negative [LA]   0957 Ketones, UA: Negative [LA]   0957 Bilirubin, UA: Negative [LA]   0957 Blood, UA: Negative [LA]   0957 Protein, UA: Negative [LA]   0957 Leukocytes, UA: Negative [LA]   0957 Nitrite, UA: Negative [LA]   0957 Urobilinogen, UA: 0.2 E.U./dL [LA]   1059 Patient states he feels better, headache has resolved.  We will give him meclizine and Zofran to use at home discussed symptomatic treatment, follow-up and strict return precautions.  He verbalized understanding and was in agreement with this  plan of care [LA]      ED Course User Index  [LA] Dora Neal PA-C  [PF] Joby Galaviz,         Lab Results (last 24 hours)     Procedure Component Value Units Date/Time    CBC & Differential [[id number]]  (Abnormal) Collected: 09/08/22 0858    Specimen: Blood Updated: 09/08/22 0903    Narrative:      The following orders were created for panel order CBC & Differential.  Procedure                               Abnormality         Status                     ---------                               -----------         ------                     CBC Auto Differential[[id number]]        Abnormal            Final result                 Please view results for these tests on the individual orders.    Comprehensive Metabolic Panel [[id number]]  (Abnormal) Collected: 09/08/22 0858    Specimen: Blood Updated: 09/08/22 0928     Glucose 102 mg/dL      BUN 15 mg/dL      Creatinine 0.86 mg/dL      Sodium 140 mmol/L      Potassium 4.0 mmol/L      Chloride 103 mmol/L      CO2 27.2 mmol/L      Calcium 9.5 mg/dL      Total Protein 6.7 g/dL      Albumin 4.40 g/dL      ALT (SGPT) 22 U/L      AST (SGOT) 20 U/L      Alkaline Phosphatase 118 U/L      Total Bilirubin 0.5 mg/dL      Globulin 2.3 gm/dL      A/G Ratio 1.9 g/dL      BUN/Creatinine Ratio 17.4     Anion Gap 9.8 mmol/L      eGFR 117.3 mL/min/1.73      Comment: National Kidney Foundation and American Society of Nephrology (ASN) Task Force recommended calculation based on the Chronic Kidney Disease Epidemiology Collaboration (CKD-EPI) equation refit without adjustment for race.       Narrative:      GFR Normal >60  Chronic Kidney Disease <60  Kidney Failure <15      Troponin [[id number]]  (Normal) Collected: 09/08/22 0858    Specimen: Blood Updated: 09/08/22 0930     Troponin T <0.010 ng/mL     Narrative:      Troponin T Reference Range:  <= 0.03 ng/mL-   Negative for AMI  >0.03 ng/mL-     Abnormal for myocardial necrosis.  Clinicians would have to utilize clinical acumen,  EKG, Troponin and serial changes to determine if it is an Acute Myocardial Infarction or myocardial injury due to an underlying chronic condition.       Results may be falsely decreased if patient taking Biotin.      Magnesium [[id number]]  (Normal) Collected: 09/08/22 0858    Specimen: Blood Updated: 09/08/22 0928     Magnesium 1.8 mg/dL     CBC Auto Differential [[id number]]  (Abnormal) Collected: 09/08/22 0858    Specimen: Blood Updated: 09/08/22 0903     WBC 8.99 10*3/mm3      RBC 5.29 10*6/mm3      Hemoglobin 16.1 g/dL      Hematocrit 46.0 %      MCV 87.0 fL      MCH 30.4 pg      MCHC 35.0 g/dL      RDW 11.9 %      RDW-SD 38.2 fl      MPV 8.2 fL      Platelets 231 10*3/mm3      Neutrophil % 52.7 %      Lymphocyte % 36.8 %      Monocyte % 6.0 %      Eosinophil % 3.2 %      Basophil % 1.0 %      Immature Grans % 0.3 %      Neutrophils, Absolute 4.73 10*3/mm3      Lymphocytes, Absolute 3.31 10*3/mm3      Monocytes, Absolute 0.54 10*3/mm3      Eosinophils, Absolute 0.29 10*3/mm3      Basophils, Absolute 0.09 10*3/mm3      Immature Grans, Absolute 0.03 10*3/mm3      nRBC 0.0 /100 WBC     Urinalysis With Microscopic If Indicated (No Culture) - Urine, Clean Catch [[id number]]  (Normal) Collected: 09/08/22 0944    Specimen: Urine, Clean Catch Updated: 09/08/22 0950     Color, UA Yellow     Appearance, UA Clear     pH, UA 7.5     Specific Gravity, UA 1.024     Glucose, UA Negative     Ketones, UA Negative     Bilirubin, UA Negative     Blood, UA Negative     Protein, UA Negative     Leuk Esterase, UA Negative     Nitrite, UA Negative     Urobilinogen, UA 0.2 E.U./dL    Narrative:      Urine microscopic not indicated.          CT Head Without Contrast    Result Date: 9/8/2022  HEAD CT     9/8/2022 9:18 AM  HISTORY: Persistent headache, dizziness  TECHNIQUE: Multiple axial CT images were performed from the foramen magnum to the vertex. Coronal reformatted images were reconstructed from axial data set. This study was  performed with techniques to keep radiation doses as low as reasonably achievable (ALARA). Individualized dose reduction techniques using automated exposure control or adjustment of mA and/or kV according to the patient size were employed. 733.70 mGy.cm  COMPARISON: None  FINDINGS: No acute intracranial hemorrhage or large acute cortical infarct. The brain volume is normal for the patient's age. Ventricles are normal in size and configuration. No midline shift. The basal cisterns are patent.  No skull fracture. The visualized paranasal sinuses and mastoid air cells are clear.      Impression: No acute intracranial abnormality.   CRITICAL RESULT: No.  COMMUNICATION: Per this written report.  Images personally reviewed, interpreted, and dictated by GIL Aguilar.          This report was signed and finalized on 9/8/2022 9:28 AM by GIL Aguilar.    XR Chest 1 View    Result Date: 9/8/2022  CLINICAL INDICATION:  Weak/Dizzy/AMS triage protocol  EXAMINATION TECHNIQUE: XR CHEST 1 VW-  COMPARISON: Radiographs 07/06/2021.  FINDINGS: Lungs are clear without evidence of focal airspace consolidation. No pneumothorax. No pleural effusion. Heart and mediastinal contours are within normal limits. No acute osseous or soft tissue abnormalities. No free air under the hemidiaphragms. Prominent perihilar vascular markings, appears unchanged from prior exam.      Impression: No acute cardiopulmonary findings.  Images personally reviewed, interpreted and dictated by GIL Aguilar.       This report was signed and finalized on 9/8/2022 9:17 AM by GIL Aguilar.                                         MDM  Number of Diagnoses or Management Options  Dizziness on standing  Nonintractable headache, unspecified chronicity pattern, unspecified headache type  Diagnosis management comments: On arrival, patient is stable.  He is afebrile, no acute distress, nontoxic appearance.  Differential could include  sinusitis, dehydration, electrolyte abnormalities, intracranial abnormality, migraine, vertigo, cardiac dysrhythmia, and other concerns.  See no focal deficits concerning for CVA.  He has no tenderness over the sinuses concerning for sinusitis.  Obtain basic labs, magnesium, troponin, chest x-ray, EKG and head CT.  Pa will give IV fluids as well.  Patient declines COVID-19 testing.    Patient's labs are stable without significant abnormalities.  CT of the head and chest x-ray read by the radiologist without acute findings.  EKG was interpreted by the attending.  Patient felt better after medications.  His vital signs were stable.  Believe he can be discharged at this time with close follow-up and strict return precautions.  We will call in Zofran and meclizine for him to use.  Discussed strict return precautions to the ER.  He verbalized understanding and was in agreement with this plan of care.       Amount and/or Complexity of Data Reviewed  Clinical lab tests: reviewed and ordered  Tests in the radiology section of CPT®: reviewed and ordered  Tests in the medicine section of CPT®: reviewed  Discussion of test results with the performing providers: yes  Review and summarize past medical records: yes  Discuss the patient with other providers: yes    Risk of Complications, Morbidity, and/or Mortality  Presenting problems: moderate  Diagnostic procedures: moderate  Management options: low    Patient Progress  Patient progress: improved      Final diagnoses:   Dizziness on standing   Nonintractable headache, unspecified chronicity pattern, unspecified headache type       ED Disposition  ED Disposition     ED Disposition   Discharge    Condition   Stable    Comment   --             PATIENT CONNECTION - [facility] [address] [phone]  Schedule an appointment as soon as possible for a visit            Medication List      New Prescriptions    meclizine 25 MG tablet  Commonly known as: ANTIVERT  Take 1  tablet by mouth 3 (Three) Times a Day As Needed for Dizziness or Nausea for up to 3 days.     ondansetron ODT 4 MG disintegrating tablet  Commonly known as: ZOFRAN-ODT  Place 1 tablet on the tongue Every 6 (Six) Hours As Needed for Nausea or Vomiting for up to 2 days.           Where to Get Your Medications      These medications were sent to [id number] [place], [place] - [address] [place] AT [facility] [address] [facility] - [phone]  - [phone] [address] [facility] [address]    Phone: [phone]   · meclizine 25 MG tablet  · ondansetron ODT 4 MG disintegrating tablet          Dora Neal PA-C  09/08/22 124

## 2022-09-08 NOTE — DISCHARGE INSTRUCTIONS
Lab work and imaging here today stable.  Drink plenty of fluids to stay well-hydrated.  Take meclizine as needed for dizziness, Zofran as needed for nausea and vomiting.  Continue over-the-counter ibuprofen and or Tylenol to help with headache.  Try to follow-up with your primary care provider in the next few days to reevaluate symptoms and ensure you are improving.  Return to the ER for any change, worsening symptoms, or any additional concerns.

## 2022-10-19 ENCOUNTER — HOSPITAL ENCOUNTER (EMERGENCY)
Facility: HOSPITAL | Age: 32
Discharge: HOME | End: 2022-10-19
Attending: FAMILY MEDICINE | Admitting: EMERGENCY MEDICINE

## 2022-10-19 ENCOUNTER — APPOINTMENT (OUTPATIENT)
Dept: GENERAL RADIOLOGY | Facility: HOSPITAL | Age: 32
End: 2022-10-19

## 2022-10-19 VITALS
TEMPERATURE: 97.8 F | HEART RATE: 78 BPM | BODY MASS INDEX: 29.8 KG/M2 | HEIGHT: 72 IN | OXYGEN SATURATION: 100 % | WEIGHT: 220 LBS | DIASTOLIC BLOOD PRESSURE: 60 MMHG | SYSTOLIC BLOOD PRESSURE: 121 MMHG | RESPIRATION RATE: 16 BRPM

## 2022-10-19 DIAGNOSIS — U07.1 COVID-19: Primary | ICD-10-CM

## 2022-10-19 DIAGNOSIS — J06.9 VIRAL URI: ICD-10-CM

## 2022-10-19 LAB
ALBUMIN SERPL-MCNC: 4.6 G/DL (ref 3.5–5.2)
ALBUMIN/GLOB SERPL: 1.8 G/DL
ALP SERPL-CCNC: 109 U/L (ref 39–117)
ALT SERPL W P-5'-P-CCNC: 15 U/L (ref 1–41)
ANION GAP SERPL CALCULATED.3IONS-SCNC: 8 MMOL/L (ref 5–15)
AST SERPL-CCNC: 13 U/L (ref 1–40)
BASOPHILS # BLD AUTO: 0.06 10*3/MM3 (ref 0–0.2)
BASOPHILS NFR BLD AUTO: 0.7 % (ref 0–1.5)
BILIRUB SERPL-MCNC: 0.2 MG/DL (ref 0–1.2)
BUN SERPL-MCNC: 16 MG/DL (ref 6–20)
BUN/CREAT SERPL: 23.5 (ref 7–25)
CALCIUM SPEC-SCNC: 9.2 MG/DL (ref 8.6–10.5)
CHLORIDE SERPL-SCNC: 107 MMOL/L (ref 98–107)
CO2 SERPL-SCNC: 28 MMOL/L (ref 22–29)
CREAT SERPL-MCNC: 0.68 MG/DL (ref 0.76–1.27)
D DIMER PPP FEU-MCNC: <0.27 MCGFEU/ML (ref 0–0.57)
DEPRECATED RDW RBC AUTO: 39.8 FL (ref 37–54)
EGFRCR SERPLBLD CKD-EPI 2021: 125.9 ML/MIN/1.73
EOSINOPHIL # BLD AUTO: 0.16 10*3/MM3 (ref 0–0.4)
EOSINOPHIL NFR BLD AUTO: 1.9 % (ref 0.3–6.2)
ERYTHROCYTE [DISTWIDTH] IN BLOOD BY AUTOMATED COUNT: 12.1 % (ref 12.3–15.4)
GLOBULIN UR ELPH-MCNC: 2.6 GM/DL
GLUCOSE SERPL-MCNC: 106 MG/DL (ref 65–99)
HCT VFR BLD AUTO: 49.6 % (ref 37.5–51)
HGB BLD-MCNC: 16.6 G/DL (ref 13–17.7)
IMM GRANULOCYTES # BLD AUTO: 0.03 10*3/MM3 (ref 0–0.05)
IMM GRANULOCYTES NFR BLD AUTO: 0.4 % (ref 0–0.5)
LYMPHOCYTES # BLD AUTO: 2.41 10*3/MM3 (ref 0.7–3.1)
LYMPHOCYTES NFR BLD AUTO: 29.1 % (ref 19.6–45.3)
MCH RBC QN AUTO: 29.9 PG (ref 26.6–33)
MCHC RBC AUTO-ENTMCNC: 33.5 G/DL (ref 31.5–35.7)
MCV RBC AUTO: 89.2 FL (ref 79–97)
MONOCYTES # BLD AUTO: 0.48 10*3/MM3 (ref 0.1–0.9)
MONOCYTES NFR BLD AUTO: 5.8 % (ref 5–12)
NEUTROPHILS NFR BLD AUTO: 5.14 10*3/MM3 (ref 1.7–7)
NEUTROPHILS NFR BLD AUTO: 62.1 % (ref 42.7–76)
NRBC BLD AUTO-RTO: 0 /100 WBC (ref 0–0.2)
PLATELET # BLD AUTO: 224 10*3/MM3 (ref 140–450)
PMV BLD AUTO: 8.4 FL (ref 6–12)
POTASSIUM SERPL-SCNC: 4.2 MMOL/L (ref 3.5–5.2)
PROT SERPL-MCNC: 7.2 G/DL (ref 6–8.5)
RBC # BLD AUTO: 5.56 10*6/MM3 (ref 4.14–5.8)
SARS-COV-2 RNA PNL SPEC NAA+PROBE: DETECTED
SODIUM SERPL-SCNC: 143 MMOL/L (ref 136–145)
TROPONIN T SERPL-MCNC: <0.01 NG/ML (ref 0–0.03)
WBC NRBC COR # BLD: 8.28 10*3/MM3 (ref 3.4–10.8)

## 2022-10-19 PROCEDURE — 87635 SARS-COV-2 COVID-19 AMP PRB: CPT | Performed by: FAMILY MEDICINE

## 2022-10-19 PROCEDURE — 80053 COMPREHEN METABOLIC PANEL: CPT | Performed by: FAMILY MEDICINE

## 2022-10-19 PROCEDURE — 85025 COMPLETE CBC W/AUTO DIFF WBC: CPT | Performed by: FAMILY MEDICINE

## 2022-10-19 PROCEDURE — 99283 EMERGENCY DEPT VISIT LOW MDM: CPT

## 2022-10-19 PROCEDURE — 71045 X-RAY EXAM CHEST 1 VIEW: CPT

## 2022-10-19 PROCEDURE — 85379 FIBRIN DEGRADATION QUANT: CPT | Performed by: FAMILY MEDICINE

## 2022-10-19 PROCEDURE — 84484 ASSAY OF TROPONIN QUANT: CPT | Performed by: FAMILY MEDICINE

## 2022-10-19 NOTE — ED PROVIDER NOTES
Subjective   History of Present Illness  32-year-old male presents the emergency department complaining of testing positive at home test on Sunday for COVID-19.  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  Patient says that he it hurts when he takes a deep breath and has some chest tightness.    History provided by:  Patient  Illness  Location:  See hpi  Severity:  Moderate  Onset quality:  Sudden  Timing:  Constant  Progression:  Resolved  Chronicity:  New  Context:  See hpi  Relieved by:  Nothing  Worsened by:  Nothing  Ineffective treatments:  None  Associated symptoms: chest pain, fatigue and myalgias    Associated symptoms: no abdominal pain and no fever        Review of Systems   Constitutional: Positive for fatigue. Negative for fever.   HENT: Negative.    Respiratory: Negative.    Cardiovascular: Positive for chest pain.   Gastrointestinal: Negative.  Negative for abdominal pain.   Endocrine: Negative.    Genitourinary: Negative.  Negative for dysuria.   Musculoskeletal: Positive for myalgias.   Skin: Negative.    Neurological: Negative.    Psychiatric/Behavioral: Negative.    All other systems reviewed and are negative.      Past Medical History:   Diagnosis Date   • Back pain    • Depression    • Seizures (HCC)        No Known Allergies    Past Surgical History:   Procedure Laterality Date   • AMPUTATION FINGER / THUMB Left     3rd, 4th, and 5th digits    • HAND SURGERY     • SEPTOPLASTY         Family History   Problem Relation Age of Onset   • Stroke Father    • Heart  attack Father    • Hypertension Father        Social History     Socioeconomic History   • Marital status: Single   Tobacco Use   • Smoking status: Former     Packs/day: 0.50     Types: Cigarettes   • Smokeless tobacco: Never   Vaping Use   • Vaping Use: Former   • Substances: Nicotine   • Devices: Disposable   Substance and Sexual Activity   • Alcohol use: No   • Drug use: No   • Sexual activity: Yes     Partners: Female           Objective   Physical Exam  Vitals and nursing note reviewed.   Constitutional:       Appearance: Normal appearance.   HENT:      Head: Normocephalic and atraumatic.      Right Ear: Tympanic membrane normal.      Left Ear: Tympanic membrane normal.      Nose: Nose normal.      Mouth/Throat:      Mouth: Mucous membranes are moist.   Eyes:      Extraocular Movements: Extraocular movements intact.      Pupils: Pupils are equal, round, and reactive to light.   Cardiovascular:      Rate and Rhythm: Normal rate and regular rhythm.   Pulmonary:      Effort: Pulmonary effort is normal.   Abdominal:      General: Abdomen is flat.      Palpations: Abdomen is soft.   Musculoskeletal:         General: Normal range of motion.      Cervical back: Normal range of motion.   Skin:     General: Skin is warm.      Capillary Refill: Capillary refill takes less than 2 seconds.   Neurological:      General: No focal deficit present.      Mental Status: He is alert.   Psychiatric:         Mood and Affect: Mood normal.         Behavior: Behavior normal.         Procedures           ED Course  ED Course as of 10/19/22 0735   Wed Oct 19, 2022   0631 EKG interpretation is by myself it interpretation time is 6:42 AM sinus rhythm 76 bpm QRS duration is 78 QT is 366 QTC is 397 there is no acute ST elevation or depression. [MH]      ED Course User Index  [MH] Kennedi Wright DO                                 PULSE OXIMETRY INTERPRETATION  Patient had a pulse ox of 100% on room air. This is a normal pulse  "oximetry reading.        Vitals:    10/19/22 0553   BP: 123/48   BP Location: Left arm   Patient Position: Sitting   Pulse: 73   Resp: 16   Temp: 97.5 °F (36.4 °C)   TempSrc: Oral   SpO2: 100%   Weight: 99.8 kg (220 lb)   Height: 182.9 cm (72\")         Lab Results (last 24 hours)     Procedure Component Value Units Date/Time    COVID-19,[facility] IN-HOUSE,Nasal Swab No Transport Media 3-4 HR TAT - Swab, Nasal Cavity [[id number]]  (Abnormal) Collected: 10/19/22 0614    Specimen: Swab from Nasal Cavity Updated: 10/19/22 0652     COVID19 Detected    Narrative:      Fact sheet for providers: https://www.fda.gov/media/532469/download     Fact sheet for patients: https://www.fda.gov/media/751864/download    Test performed by PCR.    Consider negative results in combination with clinical observations, patient history, and epidemiological information.    CBC & Differential [[id number]]  (Abnormal) Collected: 10/19/22 0651    Specimen: Blood Updated: 10/19/22 0656    Narrative:      The following orders were created for panel order CBC & Differential.  Procedure                               Abnormality         Status                     ---------                               -----------         ------                     CBC Auto Differential[[id number]]        Abnormal            Final result                 Please view results for these tests on the individual orders.    Comprehensive Metabolic Panel [[id number]]  (Abnormal) Collected: 10/19/22 0651    Specimen: Blood Updated: 10/19/22 0723     Glucose 106 mg/dL      BUN 16 mg/dL      Creatinine 0.68 mg/dL      Sodium 143 mmol/L      Potassium 4.2 mmol/L      Chloride 107 mmol/L      CO2 28.0 mmol/L      Calcium 9.2 mg/dL      Total Protein 7.2 g/dL      Albumin 4.60 g/dL      ALT (SGPT) 15 U/L      AST (SGOT) 13 U/L      Alkaline Phosphatase 109 U/L      Total Bilirubin 0.2 mg/dL      Globulin 2.6 gm/dL      A/G Ratio 1.8 g/dL      BUN/Creatinine Ratio 23.5     Anion Gap 8.0 " mmol/L      eGFR 125.9 mL/min/1.73      Comment: National Kidney Foundation and American Society of Nephrology (ASN) Task Force recommended calculation based on the Chronic Kidney Disease Epidemiology Collaboration (CKD-EPI) equation refit without adjustment for race.       Narrative:      GFR Normal >60  Chronic Kidney Disease <60  Kidney Failure <15      Troponin [[id number]]  (Normal) Collected: 10/19/22 0651    Specimen: Blood Updated: 10/19/22 0723     Troponin T <0.010 ng/mL     Narrative:      Troponin T Reference Range:  <= 0.03 ng/mL-   Negative for AMI  >0.03 ng/mL-     Abnormal for myocardial necrosis.  Clinicians would have to utilize clinical acumen, EKG, Troponin and serial changes to determine if it is an Acute Myocardial Infarction or myocardial injury due to an underlying chronic condition.       Results may be falsely decreased if patient taking Biotin.      D-dimer, Quantitative [[id number]]  (Normal) Collected: 10/19/22 0651    Specimen: Blood Updated: 10/19/22 0726     D-Dimer, Quantitative <0.27 MCGFEU/mL     CBC Auto Differential [[id number]]  (Abnormal) Collected: 10/19/22 0651    Specimen: Blood Updated: 10/19/22 0656     WBC 8.28 10*3/mm3      RBC 5.56 10*6/mm3      Hemoglobin 16.6 g/dL      Hematocrit 49.6 %      MCV 89.2 fL      MCH 29.9 pg      MCHC 33.5 g/dL      RDW 12.1 %      RDW-SD 39.8 fl      MPV 8.4 fL      Platelets 224 10*3/mm3      Neutrophil % 62.1 %      Lymphocyte % 29.1 %      Monocyte % 5.8 %      Eosinophil % 1.9 %      Basophil % 0.7 %      Immature Grans % 0.4 %      Neutrophils, Absolute 5.14 10*3/mm3      Lymphocytes, Absolute 2.41 10*3/mm3      Monocytes, Absolute 0.48 10*3/mm3      Eosinophils, Absolute 0.16 10*3/mm3      Basophils, Absolute 0.06 10*3/mm3      Immature Grans, Absolute 0.03 10*3/mm3      nRBC 0.0 /100 WBC             DO ANN Henning received this patient on signout from Dr. Wright.  The patient presented to the ED with concern for COVID.  He  tested positive for COVID at home recently.  Patient states that he was at work when he started to feel some tightness in his chest and lightheaded.  It lasted for a very few minutes.  Did not have specific chest pain.  EKG was unremarkable and nonischemic.  Troponin was negative.  D-dimer was negative for acute process.  Chest x-ray is negative for acute process.  COVID-positive.  This explains his symptomatology and the chest tightness and COVID infection.  Low suspicion for ACS given heart score of 0.  Patient is appropriate for discharge follow-up outpatient as needed.  The patient is agreeable to this plan.        Final diagnoses:   COVID-19   Viral URI       ED Disposition  ED Disposition     ED Disposition   Discharge    Condition   Stable    Comment   --             PATIENT CONNECTION - [facility] [address] [phone]             Medication List      No changes were made to your prescriptions during this visit.          Jarvis Sharif, DO  10/19/22 0734

## 2023-02-04 ENCOUNTER — OFFICE VISIT (OUTPATIENT)
Dept: INTERNAL MEDICINE | Facility: CLINIC | Age: 33
End: 2023-02-04
Payer: COMMERCIAL

## 2023-02-04 VITALS
BODY MASS INDEX: 30.61 KG/M2 | OXYGEN SATURATION: 98 % | WEIGHT: 226 LBS | HEART RATE: 71 BPM | RESPIRATION RATE: 16 BRPM | SYSTOLIC BLOOD PRESSURE: 114 MMHG | TEMPERATURE: 97 F | DIASTOLIC BLOOD PRESSURE: 72 MMHG | HEIGHT: 72 IN

## 2023-02-04 DIAGNOSIS — F33.1 MODERATE EPISODE OF RECURRENT MAJOR DEPRESSIVE DISORDER: Primary | ICD-10-CM

## 2023-02-04 DIAGNOSIS — M54.42 CHRONIC BILATERAL LOW BACK PAIN WITH LEFT-SIDED SCIATICA: ICD-10-CM

## 2023-02-04 DIAGNOSIS — M54.50 LUMBAR BACK PAIN: ICD-10-CM

## 2023-02-04 DIAGNOSIS — G89.29 CHRONIC BILATERAL LOW BACK PAIN WITH LEFT-SIDED SCIATICA: ICD-10-CM

## 2023-02-04 PROCEDURE — 99204 OFFICE O/P NEW MOD 45 MIN: CPT | Performed by: FAMILY MEDICINE

## 2023-02-04 RX ORDER — FLUOXETINE HYDROCHLORIDE 20 MG/1
20 CAPSULE ORAL DAILY
Qty: 30 CAPSULE | Refills: 2 | Status: SHIPPED | OUTPATIENT
Start: 2023-02-04

## 2023-02-04 NOTE — PROGRESS NOTES
"Chief Complaint  Depression (Reestablish care, discuss medications) and Anxiety (Discuss medication )    Subjective        Nikolas Cottrell presents to [facility]  History of Present Illness  Depression--recurrent. Has taken Seroquel, Cymbalta, Effexor, Zoloft per chart review. Seizure disorder history, Wellbutrin is contraindicated.     Chronic low back pain with sciatica to left. Has been going to a chiropractor.       PHQ-9 Depression Screening  Little interest or pleasure in doing things? 3-->nearly every day   Feeling down, depressed, or hopeless? 3-->nearly every day   Trouble falling or staying asleep, or sleeping too much? 0-->not at all   Feeling tired or having little energy? 3-->nearly every day   Poor appetite or overeating? 0-->not at all   Feeling bad about yourself - or that you are a failure or have let yourself or your family down? 1-->several days   Trouble concentrating on things, such as reading the newspaper or watching television? 1-->several days   Moving or speaking so slowly that other people could have noticed? Or the opposite - being so fidgety or restless that you have been moving around a lot more than usual? 3-->nearly every day   Thoughts that you would be better off dead, or of hurting yourself in some way? 0-->not at all   PHQ-9 Total Score 14   If you checked off any problems, how difficult have these problems made it for you to do your work, take care of things at home, or get along with other people? very difficult        Objective   Vital Signs:  /72 (BP Location: Right arm, Patient Position: Sitting, Cuff Size: Adult)   Pulse 71   Temp 97 °F (36.1 °C) (Temporal)   Resp 16   Ht 182.9 cm (72\")   Wt 103 kg (226 lb)   SpO2 98%   BMI 30.65 kg/m²   Estimated body mass index is 30.65 kg/m² as calculated from the following:    Height as of this encounter: 182.9 cm (72\").    Weight as of this encounter: 103 kg (226 lb).   "           Physical Exam  Vitals and nursing note reviewed.   Constitutional:       General: He is not in acute distress.     Appearance: Normal appearance. He is well-developed and well-groomed. He is obese. He is not ill-appearing, toxic-appearing or diaphoretic.      Interventions: Face mask in place.   HENT:      Head: Normocephalic and atraumatic.      Right Ear: Hearing normal.      Left Ear: Hearing normal.   Eyes:      General: Lids are normal. No scleral icterus.        Right eye: No discharge.         Left eye: No discharge.      Extraocular Movements: Extraocular movements intact.   Cardiovascular:      Rate and Rhythm: Normal rate and regular rhythm.   Pulmonary:      Effort: Pulmonary effort is normal.      Breath sounds: Normal breath sounds.   Musculoskeletal:      Cervical back: Neck supple.   Skin:     Coloration: Skin is not jaundiced or pale.   Neurological:      General: No focal deficit present.      Mental Status: He is alert and oriented to person, place, and time.   Psychiatric:         Attention and Perception: Attention and perception normal.         Mood and Affect: Affect normal. Mood is depressed.         Speech: Speech normal.         Behavior: Behavior normal. Behavior is cooperative.         Thought Content: Thought content normal.         Cognition and Memory: Cognition and memory normal.         Judgment: Judgment normal.        Result Review :                   Assessment and Plan   Diagnoses and all orders for this visit:    1. Moderate episode of recurrent major depressive disorder (HCC) (Primary)  -     FLUoxetine (PROzac) 20 MG capsule; Take 1 capsule by mouth Daily.  Dispense: 30 capsule; Refill: 2  -     Ambulatory Referral to Psychology    2. Lumbar back pain  -     Ambulatory Referral to Physical Therapy Evaluate and treat    3. Chronic bilateral low back pain with left-sided sciatica  -     Ambulatory Referral to Physical Therapy Evaluate and treat    discussed [facility]  and had planned to do today but I let him check out before we did the swab, can do next visit. Trial of Prozac, stop if si/hi. List of therapists provided, discussed long wait times, referral in for us to help set that up.    Patient to schedule physical therapy for back.          Follow Up   Return in 31 days (on 3/7/2023).  Patient was given instructions and counseling regarding his condition or for health maintenance advice. Please see specific information pulled into the AVS if appropriate.

## 2023-05-18 ENCOUNTER — OFFICE VISIT (OUTPATIENT)
Dept: PSYCHIATRY | Facility: CLINIC | Age: 33
End: 2023-05-18
Payer: COMMERCIAL

## 2023-05-18 DIAGNOSIS — F41.8 DEPRESSION WITH ANXIETY: Primary | ICD-10-CM

## 2023-05-18 NOTE — PROGRESS NOTES
Date:2023   Patient Name: Nikolas Cottrell  : 1990   MRN: [id number]   Time IN: 12:30 PM    time OUT: 1:30 PM    Referring Provider: Marleen Patrick MD    Chief Complaint:      ICD-10-CM ICD-9-CM   1. Depression with anxiety  F41.8 300.4        History of Present Illness:   Nikolas Cottrell is a 33 y.o. male who is being seen today for individual Psychotherapy session regarding emotional distress due to his current romantic partner leaving him over the weekend.      Past Psychiatric History:   N/A    Assessment Scores:   PHQ-9 Total Score:    ABDIAZIZ-7 Score:      Work History:   Highest level of education obtained: 12th grade  Ever been active duty in the ? no  Patient's Occupation:     Interpersonal/Relational:  Marital Status:  living separately  Support system: The patient's partner was previously his biggest support system.  Currently he has 1 friend who he trusts to speak with about the situation.    Mental/Behavioral Health History:  History of prior treatment or hospitalization: Prior counseling after the death of his mother but the patient reports it was not effective.  Past diagnoses: Depression with anxiety  Are there any significant health issues (current or past): N/A  History of seizures: no    Family Psychiatric History:  N/A    History of Substance Use:  Patient answered no      Significant Life Events:   Verbal, physical, sexual abuse? no  Has patient experienced a death / loss of relationship? Yes, the patient's mother passed away suddenly from a blood clot several years ago.  The patient holds resentment for self for not calling her that night.  The patient experienced the loss of a previous romantic relationship after his partner had been cheating on him multiple times.  Has patient experienced a major accident or tragic events? yes    Triggers: (Persons/Places/Things/Events/Thought/Emotions): The biggest trigger for the client at present include  thoughts of guilt and self blame.    Social History:   Social History     Socioeconomic History   • Marital status: Single   Tobacco Use   • Smoking status: Former     Packs/day: 0.50     Years: 10.00     Pack years: 5.00     Types: Cigarettes     Start date: 2023     Quit date: 2023     Years since quittin.2   • Smokeless tobacco: Never   Vaping Use   • Vaping Use: Former   • Substances: Nicotine   • Devices: Disposable   Substance and Sexual Activity   • Alcohol use: No   • Drug use: No   • Sexual activity: Yes     Partners: Female        Past Medical History:   Past Medical History:   Diagnosis Date   • Anxiety    • Back pain    • Benzodiazepine dependence 2016   • Depression    • Seizures        Past Surgical History:   Past Surgical History:   Procedure Laterality Date   • AMPUTATION FINGER / THUMB Left     3rd, 4th, and 5th digits    • HAND SURGERY     • SEPTOPLASTY         Family History:   Family History   Problem Relation Age of Onset   • Stroke Father    • Heart attack Father    • Hypertension Father    • Anxiety disorder Father        Medications:     Current Outpatient Medications:   •  FLUoxetine (PROzac) 20 MG capsule, Take 1 capsule by mouth Daily., Disp: 30 capsule, Rfl: 2    Allergies:   No Known Allergies    MENTAL STATUS EXAM   General Appearance:  Cleanly groomed and dressed  Eye Contact:  Poor eye contact  Attitude:  Cooperative  Motor Activity:  Fidgety and restless  Speech:  Normal rate, tone, volume  Mood and affect:  Depressed  Thought Process:  Other  Other Comment:  Self blaming and his thought process is consumed by feelings of guilt  Associations/ Thought Content:  No delusions  Hallucinations:  None  Suicidal Ideations:  Not present  Homicidal Ideation:  Not present  Fund of Knowledge:  Appropriate for age and educational level      SUICIDE RISK ASSESSMENT/CSSRS:  1. Does patient have thoughts of suicide? no  2. Does patient have intent for suicide? no  3. Does  patient have a current plan for suicide? no  4. History of suicide attempts: no  5. Family history of suicide or attempts: no  6. History of violent behaviors towards others or property: no  7. History of sexual aggression toward others: no  8. Access to firearms or weapons: no    Visit Diagnosis/Orders Placed This Visit:  Diagnoses and all orders for this visit:    1. Depression with anxiety (Primary)        PLAN:  1. Safety: No acute safety concerns  2. Risk Assessment: Risk of self-harm acutely is low. Risk of self-harm chronically is also low, but could be further elevated in the event of treatment noncompliance and/or AODA.    Treatment Plan/ Short and Long Term Goals: Continue supportive psychotherapy efforts and medications as indicated. Treatment and medication options discussed during today's visit. Patient ackowledged and verbally consented to continue with current treatment plan and was educated on the importance of compliance with treatment and follow-up appointments. Patient seems reasonably able to adhere to treatment plan.      Assisted Patient in processing above session content; acknowledged and normalized patient’s thoughts, feelings, and concerns.  Rationalized patient thought process regarding the current situation in which his romantic partner has left with the children.  The patient reports that she left Saturday night at 3 AM and told him it was due to his distrust of her.  The partner has informed him that if he can work on himself and improve they may be able to continue the relationship, but the patient does not feel a lot of hope about her taking him back.  The patient is determined to work on himself, but is currently experiencing a lot of self blame and guilt and shame.  The patient has not processed his feelings fully for several years since his mother passed away.  He reports that his entire personality changed after his death and that he has not felt jose r in a long time.  The patient  experiences mistrust of others from betrayal of friends and the betrayal from his previous partner.    The patient requested a sooner counseling appointment due to a panic attack that occurred at work today.  The counselor offered techniques to assist with potential future panic attacks.  The counselor also encouraged the patient to begin identifying if his thoughts are feeling or fact.  The patient wishes to receive medication to manage his moods, and has established an appointment to do so at [facility].    Allowed Patient to freely discuss issues  without interruption or judgement with unconditional positive regard, active listening skills, and empathy. Therapist provided a safe, confidential environment to facilitate the development of a positive therapeutic relationship and encouraged open, honest communication. Assisted Patient in identifying risk factors which would indicate the need for higher level of care including thoughts to harm self or others and/or self-harming behavior and encouraged Patient to contact this office, call 911, or present to the nearest emergency room should any of these events occur. Discussed crisis intervention services and means to access. Patient adamantly and convincingly denies current suicidal or homicidal ideation or perceptual disturbance. Assisted Patient in processing session content; acknowledged and normalized Patient’s thoughts, feelings, and concerns by utilizing a person-centered approach in efforts to build appropriate rapport and a positive therapeutic relationship with open and honest communication.     Quality Measures:     TOBACCO USE:  Never smoker    I advised Nikolas of the risks of tobacco use.     Follow Up:   No follow-ups on file.    SALINA Parry   [facility] [place]     This document has been electronically signed by SALINA Sanford  May 18, 2023 12:28 EDT

## 2023-05-24 ENCOUNTER — OFFICE VISIT (OUTPATIENT)
Dept: PSYCHIATRY | Facility: CLINIC | Age: 33
End: 2023-05-24
Payer: COMMERCIAL

## 2023-05-24 DIAGNOSIS — F41.8 DEPRESSION WITH ANXIETY: Primary | ICD-10-CM

## 2023-05-24 NOTE — PROGRESS NOTES
"Date:2023   Patient Name: Nikolas Cottrell  : 1990   MRN: [id number]   Time IN: 3:32PM    Time OUT: 4:30PM     Referring Provider: Marleen Patrick MD    PROGRESS NOTE    History of Present Illness:   Nikolas Cottrell is a 33 y.o. male who is being seen today for follow up individual Psychotherapy session.     Chief Complaint:      ICD-10-CM ICD-9-CM   1. Depression with anxiety  F41.8 300.4        Clinical Maneuvering/Intervention:   Assisted patient in processing above session content; acknowledged and normalized patient’s thoughts, feelings, and concerns to build appropriate rapport and a positive therapeutic relationship with open and honest communication.  Processed and rationalized patients thoughts and feelings regarding current relationship status. Patient reported that his ex partner has been talking poorly to him, and he particularly doesn't like this when she does it in front of their children. Patient reported that she has begun to use the idea of court as a threat against him as well. Patient reported that he has gained acceptance they may not be together again, and he's unsure if he would want to be together -- currently the patient is intending to focus on himself in therapy for himself and his children. Patient discussed the death of his mother from  in session, sharing that his mother wouldn't want him to be feeling this regret and anger. He says that his mother had been his best friend and described being lost after she passed. A few months after her death, his father remarried and he says he lost respect for him. This signified another loss since the dynamic of relationship changed. He reports that his father became the \"fun dad\" but he needed help with his grief. The patient says that his father would no longer speak of his mother and he had told the patient on a few occasions that he hadn't been there for her as she . A week or two after his mother passed " away, his mother's side of the family took custody of his younger sister and it was the last time he'd seen her (she has since passed after being put in a home for special needs). He felt angry about this and says the family rejected him, threatening to call police on him. Patient reported that a month later his grandmother passed away and he couldn't bring himself to attend the . The patient identified being stuck in the anger and depression phases of grief since his mother passed. The patient also discussed how much he currently misses his children (his son is staying with him as his partner has no legal ties to him).   Also discussed coping skills for patient to implement such as journaling.     Allowed patient to freely discuss issues without interruption or judgment. Provided safe, confidential environment to facilitate the development of positive therapeutic relationship and encourage open, honest communication. Assisted patient in identifying risk factors which would indicate the need for higher level of care including thoughts to harm self or others and/or self-harming behavior and encouraged patient to contact this office, call 911, or present to the nearest emergency room should any of these events occur. Discussed crisis intervention services and means to access. Patient adamantly and convincingly denies current suicidal or homicidal ideation or perceptual disturbance.    MENTAL STATUS EXAM    Patient's Support Network Includes:  son    Functional Status: Mild impairment     Progress toward goal: Not at goal    Prognosis: Fair with Ongoing Treatment     Medications:     Current Outpatient Medications:   •  FLUoxetine (PROzac) 20 MG capsule, Take 1 capsule by mouth Daily., Disp: 30 capsule, Rfl: 2    Visit Diagnosis/Orders Placed This Visit:  Diagnoses and all orders for this visit:    1. Depression with anxiety (Primary)        PLAN:  1. Safety: No acute safety concerns  2. Risk Assessment: Risk of  self-harm acutely is low. Risk of self-harm chronically is also low, but could be further elevated in the event of treatment noncompliance and/or AODA.      Treatment Plan/Goals: Patient will continue supportive psychotherapy efforts and medication regimen as prescribed. Therapist will provide Cognitive Behavioral Therapy to assist patient in improving functioning and gaining coping skills, maintaining stability, and avoiding decompensation and the need for higher level of care. Plan for treatment was discussed during today's visit. Patient acknowledged and verbally consented to continue with current treatment plan and was educated on the importance of compliance with treatment and follow-up appointments.     Patient will contact this office, call 911 or present to the nearest emergency room should suicidal or homicidal ideations occur.     Follow Up:   No follow-ups on file.    SALINA Parry   [facility] [place]     This document has been electronically signed by SALINA Sanford  May 24, 2023 17:23 EDT

## 2023-06-01 ENCOUNTER — OFFICE VISIT (OUTPATIENT)
Dept: PSYCHIATRY | Facility: CLINIC | Age: 33
End: 2023-06-01
Payer: COMMERCIAL

## 2023-06-01 VITALS
DIASTOLIC BLOOD PRESSURE: 76 MMHG | WEIGHT: 218 LBS | HEART RATE: 88 BPM | HEIGHT: 72 IN | SYSTOLIC BLOOD PRESSURE: 118 MMHG | BODY MASS INDEX: 29.53 KG/M2

## 2023-06-01 DIAGNOSIS — F41.1 GENERALIZED ANXIETY DISORDER: ICD-10-CM

## 2023-06-01 DIAGNOSIS — F33.2 SEVERE EPISODE OF RECURRENT MAJOR DEPRESSIVE DISORDER, WITHOUT PSYCHOTIC FEATURES: Primary | ICD-10-CM

## 2023-06-01 RX ORDER — MIRTAZAPINE 15 MG/1
15 TABLET, FILM COATED ORAL NIGHTLY
Qty: 30 TABLET | Refills: 2 | Status: SHIPPED | OUTPATIENT
Start: 2023-06-01 | End: 2023-06-06

## 2023-06-01 RX ORDER — HYDROXYZINE HYDROCHLORIDE 25 MG/1
25 TABLET, FILM COATED ORAL 4 TIMES DAILY PRN
Qty: 120 TABLET | Refills: 2 | Status: SHIPPED | OUTPATIENT
Start: 2023-06-01 | End: 2023-06-06

## 2023-06-01 NOTE — PROGRESS NOTES
"    Office Note     Name: Nikolas Cottrell    : 1990     MRN: [id number]     Chief Complaint  Worsening depression and anxiety after recent separation from partner    Subjective     History of Present Illness: Nikolas Cottrell presents to [facility] for medication management. This is my first time meeting with Nikolas. He has a history of anxiety, depression, and remote benzodiazepine dependence. He recently started psychotherapy with Letty at this clinic, who referred pt to me. He scored 17 on PHQ-9 and 19 on ABDIAZIZ-7.     Pt reports that he has been struggling with anxiety and depression since  after his mom . He says that he cannot recall that he has ever been happy again since then, and that mood had gradually been deteriorating again over the past few years. Pt has had acute worsening of depression and anxiety since his partner of 12 years took their 3 children and left him about 2.5 weeks ago (pt continues to live with his 15 yo son from a previous relationship). He has been struggling to get out of bed to go to work each morning, even though he usually loves working. He c/o anhedonia - he can recognize things that should make him happy but they do not. He is constantly wondering \"Where did it all go wrong?\". Sleep has been very poor since the separation. He has been sleeping about 2.5 hours/night since then, when he usually sleeps about 5-6 hours/night. He endorses daytime fatigue. He is able to concentrate on his work and complete his tasks, but finds that his mind wanders easily to thoughts of his partner and his children. He has had no drive to play with his children during their visits, and feels extremely guilty about this. Appetite has been low, and he has been eating only 1-2 meals/day. Anxiety has been high. He feels like he worries a lot about \"everything\", and is always waiting for something to go wrong. He is worried that if he " "does not do everything his partner wants of him (such as purchasing things and running errands), she will take him to court for the kids. He also worries about having to take on additional responsibilities that partner typically took care of in the past, such as making a budget. Although he denies financial stress at this time, he does worry if he will be able to adequately support his 13 yo son while paying child support. He had his first panic attack 2 weeks ago while already feeling anxious at work thinking about the situation with his partner - experienced SOA, heart racing, sweaty palms, felt dizzy and lightheaded, thoughts racing. This lasted for about 4-5 hours. He has had one more panic attack since then. When feeling anxious, he tries to relax by watching TV or listening to music, but this is not very effective. Pt reports thinking that \"I might be better off not here\" immediately after his partner left, but denied any plan or intent and has not had any suicidal thoughts since then.       Social history:   Pt was very close to his mother, who passed away in 2007. Partner of 12 years left pt 2.5 weeks ago with their children. A previous relationship of 8 years ended after partner cheated. Pt has total of 4 children (13 yo son from previous relationship, 7, 6, and 3 yos from most recent relationship). As of 2.5 weeks ago, pt lives only with 13 yo son. Works as a  and enjoys his job. Denies financial strain but having difficult adjusting to managing finances on his own.     Psychiatric history:   Has never been inpatient. Currently sees Letty Turpin for therapy. Was also in counseling after death of his mother several years ago, did not find it helpful. Has never seen a psychiatrist, but has had several psychotropic medications prescribed by PCPs in the past.     Past medication trials:   Prozac - PCP prescribed Prozac 20 mg a few months ago, took it for 1 month but had no positive or negative " effect  Xanax - Prescribed in [id number], not really sure of effect at first, helped with anxiety at higher doses but developed dependence  Buspar - does not recall effect, was on 5 mg TID a long time ago  Zoloft - was on 50 mg in 2016, does not recall effect  Cymbalta - was on 20 mg in 2017, does not recall effect  Propranolol - was on 10 mg BID PRN in 2018, does not recall effect  Effexor XR - doesn't recall effect, was on 37.5 mg for about 1 month several years ago  Seroquel - was prescribed 12.5 mg BID PRN anxiety, not sure of effect      Review of Systems:   Review of Systems   Constitutional:  Positive for appetite change, fatigue and unexpected weight loss. Negative for chills, fever and unexpected weight gain.   HENT:  Negative for congestion, rhinorrhea and sore throat.    Gastrointestinal:  Negative for abdominal pain, diarrhea and nausea.   Psychiatric/Behavioral:  Positive for sleep disturbance, depressed mood and stress. Negative for decreased concentration, hallucinations, self-injury and suicidal ideas. The patient is nervous/anxious.    Past Medical History:   Past Medical History:   Diagnosis Date    Anxiety 2008    Back pain     Benzodiazepine dependence 02/24/2016    Depression     Seizures        Past Surgical History:   Past Surgical History:   Procedure Laterality Date    AMPUTATION FINGER / THUMB Left     3rd, 4th, and 5th digits     HAND SURGERY      SEPTOPLASTY         Medications:     Current Outpatient Medications:     FLUoxetine (PROzac) 20 MG capsule, Take 1 capsule by mouth Daily., Disp: 30 capsule, Rfl: 2    Allergies:   No Known Allergies    Family History:   Family History   Problem Relation Age of Onset    Stroke Father     Heart attack Father     Hypertension Father     Anxiety disorder Father        Social History:   Social History     Socioeconomic History    Marital status: Single   Tobacco Use    Smoking status: Former     Packs/day: 0.50     Years: 10.00     Pack years: 5.00     " Types: Cigarettes     Start date: 2023     Quit date: 2023     Years since quittin.3     Passive exposure: Past    Smokeless tobacco: Never   Vaping Use    Vaping Use: Former    Substances: Nicotine    Devices: Disposable   Substance and Sexual Activity    Alcohol use: Never    Drug use: Never    Sexual activity: Yes     Partners: Female       Objective     Vital Signs:   /76   Pulse 88   Ht 182.9 cm (72\")   Wt 98.9 kg (218 lb)   BMI 29.57 kg/m²       PHQ-9 Score:   PHQ-9 Total Score: 17     ABDIAZIZ-7  Feeling nervous, anxious or on edge: More than half the days  Not being able to stop or control worrying: Nearly every day  Worrying too much about different things: Nearly every day  Trouble Relaxing: Nearly every day  Being so restless that it is hard to sit still: Nearly every day  Feeling afraid as if something awful might happen: Nearly every day  Becoming easily annoyed or irritable: More than half the days  ABDIAZIZ 7 Total Score: 19  If you checked any problems, how difficult have these problems made it for you to do your work, take care of things at home, or get along with other people: Extremely difficult    Mental Status Exam:   Hygiene:   good  Cooperation:  Cooperative  Eye Contact:  Good  Psychomotor Behavior:  Appropriate  Affect:  Restricted, dysphoric  Mood: depressed and anxious  Speech:  Normal  Thought Process:  Goal directed and Linear  Thought Content:  Mood congruent  Suicidal:  None  Homicidal:  None  Hallucinations:  None  Delusion:  None  Memory:  Intact  Orientation:  Person, Place, Time, and Situation  Reliability:  fair  Insight:  Fair  Judgement:  Fair  Impulse Control:  Fair  Physical/Medical Issues:  No    Gait: steady and stable    Current Medications:   Current Outpatient Medications   Medication Sig Dispense Refill    FLUoxetine (PROzac) 20 MG capsule Take 1 capsule by mouth Daily. 30 capsule 2     No current facility-administered medications for this visit. "       Physical Exam  Constitutional:       General: He is not in acute distress.     Appearance: Normal appearance.   HENT:      Head: Normocephalic and atraumatic.   Eyes:      General: No scleral icterus.     Extraocular Movements: Extraocular movements intact.      Conjunctiva/sclera: Conjunctivae normal.   Pulmonary:      Effort: Pulmonary effort is normal. No respiratory distress.   Neurological:      General: No focal deficit present.      Mental Status: He is alert and oriented to person, place, and time.       Assessment and Plan         Diagnosis Plan   1. Severe episode of recurrent major depressive disorder, without psychotic features        2. Generalized anxiety disorder          Nikolas Cottrell presents to clinic for medication management. He has struggled with depression and anxiety since 2007 following the death of his mother. Mood has been gradually deteriorating over the past few years, but has acutely worsened after his long-term partner left with their shared children 2.5 weeks ago. Since then, he has struggled with low mood, excessive worries, poor motivation, impaired sleep, daytime fatigue, impaired concentration, limited appetite, and anhedonia, and has started to have severe panic attacks. He denies any current SI/HI/AVH. He is open to getting started on medication to help with his mood.     - Start Remeron 15 mg QHS for depression, anxiety, sleep, and appetite.   - Start hydroxyzine 25 mg TID PRN anxiety/sleep.   - ALEXA reviewed via PDMP - no issues.  - Reviewed available labs.  - Encouraged continued engagement in psychotherapy. Sees Letty Turpin.  - RTC in 1 month for medication follow-up. Pt will be scheduled with psychiatric APRN for ongoing care as I am leaving the outpatient clinic.    TREATMENT PLAN/GOALS: Continue supportive psychotherapy efforts and medications as indicated. Treatment and medication options discussed during today's visit. Patient ackowledged and verbally  consented to continue with current treatment plan and was educated on the importance of compliance with treatment and follow-up appointments.    DEPRESSION:  Patient screened positive for depression based on a PHQ-9 score of 14 on 2/4/2023. Follow-up recommendations include: Prescribed antidepressant medication treatment and Suicide Risk Assessment performed.       MEDICATION ISSUES:  We discussed risks, benefits, and side effects of the above medications and the patient was agreeable with the plan. Patient was educated on the importance of compliance with treatment and follow-up appointments.  Patient is agreeable to call the office with any worsening of symptoms or onset of side effects. Patient is agreeable to call 911 or go to the nearest ER should he/she begin having SI/HI.      Counseled patient regarding multimodal approach with healthy nutrition, healthy sleep, regular physical activity, social activities, counseling, and medications.      Coping skills reviewed and encouraged positive framing of thoughts    MEDS ORDERED DURING VISIT:  No orders of the defined types were placed in this encounter.    Follow Up   Return in about 1 month (around 7/1/2023).    Patient was given instructions and counseling regarding his condition or for health maintenance advice. Please see specific information pulled into the AVS if appropriate.       This document has been electronically signed by Sharon Mary MD  June 1, 2023 15:14 EDT

## 2023-06-06 ENCOUNTER — TELEPHONE (OUTPATIENT)
Dept: PSYCHIATRY | Facility: CLINIC | Age: 33
End: 2023-06-06
Payer: COMMERCIAL

## 2023-06-06 DIAGNOSIS — F41.1 GENERALIZED ANXIETY DISORDER: ICD-10-CM

## 2023-06-06 DIAGNOSIS — F33.2 SEVERE EPISODE OF RECURRENT MAJOR DEPRESSIVE DISORDER, WITHOUT PSYCHOTIC FEATURES: Primary | ICD-10-CM

## 2023-06-06 RX ORDER — HYDROXYZINE HYDROCHLORIDE 10 MG/1
10 TABLET, FILM COATED ORAL 4 TIMES DAILY PRN
Qty: 120 TABLET | Refills: 1 | Status: SHIPPED | OUTPATIENT
Start: 2023-06-06

## 2023-06-06 RX ORDER — MIRTAZAPINE 30 MG/1
30 TABLET, FILM COATED ORAL NIGHTLY
Qty: 30 TABLET | Refills: 1 | Status: SHIPPED | OUTPATIENT
Start: 2023-06-06 | End: 2024-06-05

## 2023-06-06 NOTE — TELEPHONE ENCOUNTER
Patient states that both medications are making him feel very drowsy. Taking hydroxyzine 2-3 times a day as needed and the Remeron is not helping with sleep, says it knocks him out but waking up to drowsy in mornings gets up at 4 am for work. Please advise

## 2023-06-08 ENCOUNTER — OFFICE VISIT (OUTPATIENT)
Dept: PSYCHIATRY | Facility: CLINIC | Age: 33
End: 2023-06-08
Payer: COMMERCIAL

## 2023-06-08 DIAGNOSIS — F33.0 MILD EPISODE OF RECURRENT MAJOR DEPRESSIVE DISORDER: Primary | ICD-10-CM

## 2023-06-08 NOTE — PROGRESS NOTES
"Date:2023   Patient Name: Nikolas Cottrell  : 1990   MRN: [id number]   Time IN: 3:25PM    Time OUT: 4:30PM     Referring Provider: Marleen Patrick MD    PROGRESS NOTE    History of Present Illness:   Nikolas Cottrell is a 33 y.o. male who is being seen today for follow up individual Psychotherapy session.     Chief Complaint:      ICD-10-CM ICD-9-CM   1. Mild episode of recurrent major depressive disorder  F33.0 296.31        Clinical Maneuvering/Intervention:   Assisted patient in processing above session content; acknowledged and normalized patient’s thoughts, feelings, and concerns to build appropriate rapport and a positive therapeutic relationship with open and honest communication.  Processed and rationalized patients thoughts and feelings regarding his ex-partner threatening to move with the children to [place] and threatening to not let him see them on weekends.  Due to this, patient has decided to get a  involved regarding custody agreements. Patient reported the ex is sending antagonizing messages, including degrading comment and video with another man. Patient is maintaining boundaries and is working to not lash out in response. Patient is not feeling angry - only depressed at this moment. Patient reports that he has forgotten how to be happy, and stated again that he hasn't felt happy since the death of his mother at age 16. Patient is judging himself for not crying at her  - provider validated that everyone grieves differently. Patient reported that during  it felt \"unreal\", has never made sense, and he still isn't sure why she had to pass away. He feels grief over the loss of his dad as well, but no regret. His grief has gotten worse over the years and he describes it as \"hell on earth\". Provider offered psychoeducation on complicated grief. Patient experiences intense sorrow, avoidance of reminders, problems accepting death or loss, numbness, " lack of trust, isolation, depression, guilt and self blame, and belief that he did something wrong.   Discussed triggers associated with patient's grief.  Also discussed coping skills for patient to implement such as journaling, spending time out with people, and engaging in opposite action when depressive thoughts occur.    Allowed patient to freely discuss issues without interruption or judgment. Provided safe, confidential environment to facilitate the development of positive therapeutic relationship and encourage open, honest communication. Assisted patient in identifying risk factors which would indicate the need for higher level of care including thoughts to harm self or others and/or self-harming behavior and encouraged patient to contact this office, call 911, or present to the nearest emergency room should any of these events occur. Discussed crisis intervention services and means to access. Patient adamantly and convincingly denies current suicidal or homicidal ideation or perceptual disturbance.    MENTAL STATUS EXAM    Patient's Support Network Includes:   friends    Functional Status: Moderate impairment     Progress toward goal: Not at goal    Prognosis: Fair with Ongoing Treatment     Medications:     Current Outpatient Medications:     hydrOXYzine (ATARAX) 10 MG tablet, Take 1 tablet by mouth 4 (Four) Times a Day As Needed (anxiety or sleep)., Disp: 120 tablet, Rfl: 1    mirtazapine (Remeron) 30 MG tablet, Take 1 tablet by mouth Every Night., Disp: 30 tablet, Rfl: 1    Visit Diagnosis/Orders Placed This Visit:  Diagnoses and all orders for this visit:    1. Mild episode of recurrent major depressive disorder (Primary)        PLAN:  Safety: No acute safety concerns  Risk Assessment: Risk of self-harm acutely is low. Risk of self-harm chronically is also low, but could be further elevated in the event of treatment noncompliance and/or AODA.      Treatment Plan/Goals: Patient will continue supportive  psychotherapy efforts and medication regimen as prescribed. Therapist will provide Cognitive Behavioral Therapy to assist patient in improving functioning and gaining coping skills, maintaining stability, and avoiding decompensation and the need for higher level of care. Plan for treatment was discussed during today's visit. Patient acknowledged and verbally consented to continue with current treatment plan and was educated on the importance of compliance with treatment and follow-up appointments.     Patient will contact this office, call 911 or present to the nearest emergency room should suicidal or homicidal ideations occur.     Follow Up:   No follow-ups on file.    SALINA Parry   [facility]     This document has been electronically signed by SALINA Sanford  June 8, 2023 16:33 EDT

## 2023-06-15 ENCOUNTER — OFFICE VISIT (OUTPATIENT)
Dept: PSYCHIATRY | Facility: CLINIC | Age: 33
End: 2023-06-15
Payer: COMMERCIAL

## 2023-06-15 DIAGNOSIS — F41.8 DEPRESSION WITH ANXIETY: Primary | ICD-10-CM

## 2023-06-15 NOTE — PROGRESS NOTES
Date:06/15/2023   Patient Name: Nikolas Cottrell  : 1990   MRN: [id number]   Time IN: 330    time OUT: 4:32 PM    Referring Provider: Marleen Patrick MD    PROGRESS NOTE    History of Present Illness:   Nikolas Cottrell is a 33 y.o. male who is being seen today for follow up individual Psychotherapy session.     Chief Complaint:      ICD-10-CM ICD-9-CM   1. Depression with anxiety  F41.8 300.4        Clinical Maneuvering/Intervention:   Assisted patient in processing above session content; acknowledged and normalized patient’s thoughts, feelings, and concerns to build appropriate rapport and a positive therapeutic relationship with open and honest communication.  Processed and rationalized patients thoughts and feelings regarding current life circumstances.  Patient reported that he continues to receive messages from his ex which is very irritating and as a lot of negativity in his life.  He recognizes his need to release emotion and is finding a lot of comfort in his jennie right now.  Patient will be moving across town this weekend as he is currently living 2 houses down from his ex.  Patient has begun advocating for himself more with his ex when she makes accusations.  Patient has stopped taking medication due to it causing him to sleep too much.  Patient reported missing 4 hours of work 1 morning due to oversleeping.  He plans to have another appointment with prescriber.  Patient had masked feelings of sorrow and sadness under anger for years and is now releasing these emotions.  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  Patient provider discussed the importance of acceptance of this change.  Provider used CBT to help patient challenge the self blame and guilt that he holds for himself.  Provider encouraged writing letters to his mother to help promote forgiveness for himself.  Discussed triggers associated  with patient's sadness and depression.  Also discussed coping skills for patient to implement such as writing letters to his mother, engaging more with friends, and doing activities that he enjoys such as jennie-based activities.    Allowed patient to freely discuss issues without interruption or judgment. Provided safe, confidential environment to facilitate the development of positive therapeutic relationship and encourage open, honest communication. Assisted patient in identifying risk factors which would indicate the need for higher level of care including thoughts to harm self or others and/or self-harming behavior and encouraged patient to contact this office, call 911, or present to the nearest emergency room should any of these events occur. Discussed crisis intervention services and means to access. Patient adamantly and convincingly denies current suicidal or homicidal ideation or perceptual disturbance.    MENTAL STATUS EXAM    Patient's Support Network Includes:  son    Functional Status: Moderate impairment     Progress toward goal: Not at goal    Prognosis: Fair with Ongoing Treatment     Medications:     Current Outpatient Medications:     hydrOXYzine (ATARAX) 10 MG tablet, Take 1 tablet by mouth 4 (Four) Times a Day As Needed (anxiety or sleep)., Disp: 120 tablet, Rfl: 1    mirtazapine (Remeron) 30 MG tablet, Take 1 tablet by mouth Every Night., Disp: 30 tablet, Rfl: 1    Visit Diagnosis/Orders Placed This Visit:  Diagnoses and all orders for this visit:    1. Depression with anxiety (Primary)        PLAN:  Safety: No acute safety concerns  Risk Assessment: Risk of self-harm acutely is low. Risk of self-harm chronically is also low, but could be further elevated in the event of treatment noncompliance and/or AODA.        Treatment Plan/Goals: Patient will continue supportive psychotherapy efforts and medication regimen as prescribed. Therapist will provide Cognitive Behavioral Therapy to assist  patient in improving functioning and gaining coping skills, maintaining stability, and avoiding decompensation and the need for higher level of care. Plan for treatment was discussed during today's visit. Patient acknowledged and verbally consented to continue with current treatment plan and was educated on the importance of compliance with treatment and follow-up appointments.     Patient will contact this office, call 911 or present to the nearest emergency room should suicidal or homicidal ideations occur.     Follow Up:   No follow-ups on file.    SALINA Parry   [facility]     This document has been electronically signed by SALINA Sanford  [place] 15, 2023 16:34 EDT

## 2023-09-18 ENCOUNTER — APPOINTMENT (OUTPATIENT)
Dept: GENERAL RADIOLOGY | Facility: HOSPITAL | Age: 33
End: 2023-09-18
Payer: COMMERCIAL

## 2023-09-18 ENCOUNTER — HOSPITAL ENCOUNTER (EMERGENCY)
Facility: HOSPITAL | Age: 33
Discharge: HOME | End: 2023-09-18
Attending: EMERGENCY MEDICINE | Admitting: EMERGENCY MEDICINE
Payer: COMMERCIAL

## 2023-09-18 VITALS
RESPIRATION RATE: 18 BRPM | OXYGEN SATURATION: 97 % | SYSTOLIC BLOOD PRESSURE: 121 MMHG | WEIGHT: 220 LBS | HEIGHT: 72 IN | HEART RATE: 82 BPM | DIASTOLIC BLOOD PRESSURE: 79 MMHG | BODY MASS INDEX: 29.8 KG/M2 | TEMPERATURE: 98.2 F

## 2023-09-18 DIAGNOSIS — J40 BRONCHITIS: Primary | ICD-10-CM

## 2023-09-18 LAB
ALBUMIN SERPL-MCNC: 4.9 G/DL (ref 3.5–5.2)
ALBUMIN/GLOB SERPL: 1.8 G/DL
ALP SERPL-CCNC: 125 U/L (ref 39–117)
ALT SERPL W P-5'-P-CCNC: 31 U/L (ref 1–41)
ANION GAP SERPL CALCULATED.3IONS-SCNC: 13.4 MMOL/L (ref 5–15)
AST SERPL-CCNC: 17 U/L (ref 1–40)
BASOPHILS # BLD AUTO: 0.06 10*3/MM3 (ref 0–0.2)
BASOPHILS NFR BLD AUTO: 0.5 % (ref 0–1.5)
BILIRUB SERPL-MCNC: 0.3 MG/DL (ref 0–1.2)
BUN SERPL-MCNC: 16 MG/DL (ref 6–20)
BUN/CREAT SERPL: 19.3 (ref 7–25)
CALCIUM SPEC-SCNC: 9.8 MG/DL (ref 8.6–10.5)
CHLORIDE SERPL-SCNC: 103 MMOL/L (ref 98–107)
CO2 SERPL-SCNC: 25.6 MMOL/L (ref 22–29)
CREAT SERPL-MCNC: 0.83 MG/DL (ref 0.76–1.27)
DEPRECATED RDW RBC AUTO: 40.4 FL (ref 37–54)
EGFRCR SERPLBLD CKD-EPI 2021: 117.8 ML/MIN/1.73
EOSINOPHIL # BLD AUTO: 0.02 10*3/MM3 (ref 0–0.4)
EOSINOPHIL NFR BLD AUTO: 0.2 % (ref 0.3–6.2)
ERYTHROCYTE [DISTWIDTH] IN BLOOD BY AUTOMATED COUNT: 12.3 % (ref 12.3–15.4)
FLUAV RNA RESP QL NAA+PROBE: NOT DETECTED
FLUBV RNA RESP QL NAA+PROBE: NOT DETECTED
GLOBULIN UR ELPH-MCNC: 2.7 GM/DL
GLUCOSE SERPL-MCNC: 105 MG/DL (ref 65–99)
HCT VFR BLD AUTO: 52.4 % (ref 37.5–51)
HGB BLD-MCNC: 17.6 G/DL (ref 13–17.7)
HOLD SPECIMEN: NORMAL
HOLD SPECIMEN: NORMAL
IMM GRANULOCYTES # BLD AUTO: 0.12 10*3/MM3 (ref 0–0.05)
IMM GRANULOCYTES NFR BLD AUTO: 0.9 % (ref 0–0.5)
LYMPHOCYTES # BLD AUTO: 2.6 10*3/MM3 (ref 0.7–3.1)
LYMPHOCYTES NFR BLD AUTO: 19.5 % (ref 19.6–45.3)
MCH RBC QN AUTO: 30.1 PG (ref 26.6–33)
MCHC RBC AUTO-ENTMCNC: 33.6 G/DL (ref 31.5–35.7)
MCV RBC AUTO: 89.7 FL (ref 79–97)
MONOCYTES # BLD AUTO: 0.89 10*3/MM3 (ref 0.1–0.9)
MONOCYTES NFR BLD AUTO: 6.7 % (ref 5–12)
NEUTROPHILS NFR BLD AUTO: 72.2 % (ref 42.7–76)
NEUTROPHILS NFR BLD AUTO: 9.63 10*3/MM3 (ref 1.7–7)
NRBC BLD AUTO-RTO: 0 /100 WBC (ref 0–0.2)
PLATELET # BLD AUTO: 299 10*3/MM3 (ref 140–450)
PMV BLD AUTO: 8.4 FL (ref 6–12)
POTASSIUM SERPL-SCNC: 4.2 MMOL/L (ref 3.5–5.2)
PROT SERPL-MCNC: 7.6 G/DL (ref 6–8.5)
RBC # BLD AUTO: 5.84 10*6/MM3 (ref 4.14–5.8)
SARS-COV-2 RNA RESP QL NAA+PROBE: NOT DETECTED
SODIUM SERPL-SCNC: 142 MMOL/L (ref 136–145)
WBC NRBC COR # BLD: 13.32 10*3/MM3 (ref 3.4–10.8)
WHOLE BLOOD HOLD COAG: NORMAL
WHOLE BLOOD HOLD SPECIMEN: NORMAL

## 2023-09-18 PROCEDURE — 99284 EMERGENCY DEPT VISIT MOD MDM: CPT

## 2023-09-18 PROCEDURE — 87636 SARSCOV2 & INF A&B AMP PRB: CPT

## 2023-09-18 PROCEDURE — 36415 COLL VENOUS BLD VENIPUNCTURE: CPT

## 2023-09-18 PROCEDURE — 93005 ELECTROCARDIOGRAM TRACING: CPT

## 2023-09-18 PROCEDURE — 85025 COMPLETE CBC W/AUTO DIFF WBC: CPT

## 2023-09-18 PROCEDURE — 25010000002 DEXAMETHASONE SODIUM PHOSPHATE 10 MG/ML SOLUTION: Performed by: PHYSICIAN ASSISTANT

## 2023-09-18 PROCEDURE — 93005 ELECTROCARDIOGRAM TRACING: CPT | Performed by: EMERGENCY MEDICINE

## 2023-09-18 PROCEDURE — 96372 THER/PROPH/DIAG INJ SC/IM: CPT

## 2023-09-18 PROCEDURE — 71045 X-RAY EXAM CHEST 1 VIEW: CPT

## 2023-09-18 PROCEDURE — 80053 COMPREHEN METABOLIC PANEL: CPT

## 2023-09-18 RX ORDER — SODIUM CHLORIDE 0.9 % (FLUSH) 0.9 %
10 SYRINGE (ML) INJECTION AS NEEDED
Status: DISCONTINUED | OUTPATIENT
Start: 2023-09-18 | End: 2023-09-18 | Stop reason: HOSPADM

## 2023-09-18 RX ORDER — DEXAMETHASONE SODIUM PHOSPHATE 10 MG/ML
10 INJECTION, SOLUTION INTRAMUSCULAR; INTRAVENOUS ONCE
Status: COMPLETED | OUTPATIENT
Start: 2023-09-18 | End: 2023-09-18

## 2023-09-18 RX ORDER — AZITHROMYCIN 250 MG/1
TABLET, FILM COATED ORAL
Qty: 6 TABLET | Refills: 0 | Status: SHIPPED | OUTPATIENT
Start: 2023-09-18

## 2023-09-18 RX ADMIN — DEXAMETHASONE SODIUM PHOSPHATE 10 MG: 10 INJECTION INTRAMUSCULAR; INTRAVENOUS at 14:36

## 2023-09-18 NOTE — DISCHARGE INSTRUCTIONS
Continue using albuterol inhaler as prescribed as needed.  Continue Mucinex per directions on the package.  Take azithromycin as prescribed.  Finish Augmentin course.  Follow-up with your PCP for further outpatient evaluation if symptoms persist.  Return to the ER for new or worsening symptoms or acute concerns.

## 2023-09-18 NOTE — Clinical Note
[facility] EMERGENCY DEPARTMENT  [address] [address]  Phone: [phone]    Nikolas Cottrell was seen and treated in our emergency department on 9/18/2023.  He may return to work on 09/21/2023.         Thank you for choosing [facility].    Kaleigh Collado PA-C

## 2023-09-18 NOTE — ED PROVIDER NOTES
Subjective:  Chief Complaint:  Pneumonia    History of Present Illness:  Patient is a 33-year-old male with history anxiety, back pain, depression, seizures presenting to the ER with complaints of pneumonia that was diagnosed 3 days ago and is not improving.  Patient was started on Augmentin by another provider.  He states he has continued to have shortness of breath and cough.  He has also been using an albuterol inhaler prescribed by another provider.  Denies known fevers or additional symptoms or complaints at this time.      Nurses Notes reviewed and agree, including vitals, allergies, social history and prior medical history.     REVIEW OF SYSTEMS: All systems reviewed and not pertinent unless noted.  Review of Systems   HENT:  Positive for congestion.    Respiratory:  Positive for cough and shortness of breath.    All other systems reviewed and are negative.    Past Medical History:   Diagnosis Date    Anxiety     Back pain     Benzodiazepine dependence 2016    Depression     Seizures        Allergies:    Patient has no known allergies.      Past Surgical History:   Procedure Laterality Date    AMPUTATION FINGER / THUMB Left     3rd, 4th, and 5th digits     HAND SURGERY      SEPTOPLASTY           Social History     Socioeconomic History    Marital status: Single   Tobacco Use    Smoking status: Former     Packs/day: 0.50     Years: 10.00     Pack years: 5.00     Types: Cigarettes     Start date: 2023     Quit date: 2023     Years since quittin.6     Passive exposure: Past    Smokeless tobacco: Never   Vaping Use    Vaping Use: Every day    Substances: Nicotine    Devices: Disposable   Substance and Sexual Activity    Alcohol use: Never    Drug use: Never    Sexual activity: Yes     Partners: Female         Family History   Problem Relation Age of Onset    Stroke Father     Heart attack Father     Hypertension Father     Anxiety disorder Father        Objective  Physical Exam:  /79  "(BP Location: Left arm, Patient Position: Sitting)   Pulse 82   Temp 98.2 °F (36.8 °C) (Oral)   Resp 18   Ht 182.9 cm (72\")   Wt 99.8 kg (220 lb)   SpO2 97%   BMI 29.84 kg/m²      Physical Exam  Vitals and nursing note reviewed.   Constitutional:       General: He is not in acute distress.     Appearance: He is not toxic-appearing.   HENT:      Head: Normocephalic and atraumatic.      Right Ear: External ear normal.      Left Ear: External ear normal.      Nose: Nose normal.   Eyes:      Extraocular Movements: Extraocular movements intact.      Conjunctiva/sclera: Conjunctivae normal.   Cardiovascular:      Rate and Rhythm: Normal rate.   Pulmonary:      Effort: Pulmonary effort is normal. No respiratory distress.      Breath sounds: Normal breath sounds.   Abdominal:      General: There is no distension.      Palpations: Abdomen is soft.   Musculoskeletal:         General: Normal range of motion.      Cervical back: Normal range of motion and neck supple.   Skin:     General: Skin is warm and dry.   Neurological:      General: No focal deficit present.      Mental Status: He is alert and oriented to person, place, and time.   Psychiatric:         Mood and Affect: Mood normal.         Behavior: Behavior normal.       Procedures    ED Course:    ED Course as of 09/18/23 1523   Mon Sep 18, 2023   1141 EKG interpreted by me: Sinus rhythm, normal rate, no acute ST changes, some nonspecific T waves, this is an abnormal EKG [MP]      ED Course User Index  [MP] Joaquín Barros MD       Lab Results (last 24 hours)       Procedure Component Value Units Date/Time    CBC & Differential [[id number]]  (Abnormal) Collected: 09/18/23 1139    Specimen: Blood Updated: 09/18/23 1146    Narrative:      The following orders were created for panel order CBC & Differential.  Procedure                               Abnormality         Status                     ---------                               -----------         " ------                     CBC Auto Differential[[id number]]        Abnormal            Final result                 Please view results for these tests on the individual orders.    Comprehensive Metabolic Panel [[id number]]  (Abnormal) Collected: 09/18/23 1139    Specimen: Blood Updated: 09/18/23 1204     Glucose 105 mg/dL      BUN 16 mg/dL      Creatinine 0.83 mg/dL      Sodium 142 mmol/L      Potassium 4.2 mmol/L      Chloride 103 mmol/L      CO2 25.6 mmol/L      Calcium 9.8 mg/dL      Total Protein 7.6 g/dL      Albumin 4.9 g/dL      ALT (SGPT) 31 U/L      AST (SGOT) 17 U/L      Alkaline Phosphatase 125 U/L      Total Bilirubin 0.3 mg/dL      Globulin 2.7 gm/dL      A/G Ratio 1.8 g/dL      BUN/Creatinine Ratio 19.3     Anion Gap 13.4 mmol/L      eGFR 117.8 mL/min/1.73     Narrative:      GFR Normal >60  Chronic Kidney Disease <60  Kidney Failure <15      CBC Auto Differential [[id number]]  (Abnormal) Collected: 09/18/23 1139    Specimen: Blood Updated: 09/18/23 1146     WBC 13.32 10*3/mm3      RBC 5.84 10*6/mm3      Hemoglobin 17.6 g/dL      Hematocrit 52.4 %      MCV 89.7 fL      MCH 30.1 pg      MCHC 33.6 g/dL      RDW 12.3 %      RDW-SD 40.4 fl      MPV 8.4 fL      Platelets 299 10*3/mm3      Neutrophil % 72.2 %      Lymphocyte % 19.5 %      Monocyte % 6.7 %      Eosinophil % 0.2 %      Basophil % 0.5 %      Immature Grans % 0.9 %      Neutrophils, Absolute 9.63 10*3/mm3      Lymphocytes, Absolute 2.60 10*3/mm3      Monocytes, Absolute 0.89 10*3/mm3      Eosinophils, Absolute 0.02 10*3/mm3      Basophils, Absolute 0.06 10*3/mm3      Immature Grans, Absolute 0.12 10*3/mm3      nRBC 0.0 /100 WBC     COVID-19 and FLU A/B PCR - Swab, Nasopharynx [[id number]]  (Normal) Collected: 09/18/23 1140    Specimen: Swab from Nasopharynx Updated: 09/18/23 1205     COVID19 Not Detected     Influenza A PCR Not Detected     Influenza B PCR Not Detected    Narrative:      Fact sheet for providers:  https://www.fda.gov/media/495027/download    Fact sheet for patients: https://www.fda.gov/media/566126/download    Test performed by PCR.             XR Chest 1 View    Result Date: 9/18/2023  PROCEDURE: XR CHEST 1 VW-  HISTORY: reports he was diagnosed w/pneumonia by another provider 3 days ago, reports no improvement in symptoms , cough  COMPARISON: 10/19/2022  FINDINGS:  Portable view of the chest demonstrates hypoinflation with mild left basilar atelectasis. There is no evidence of effusion, pneumothorax or other significant pleural disease. The mediastinum is unremarkable.  The heart size is normal.      Impression: Hypoinflation            MDM  Patient was evaluated in the ER for cough, congestion, shortness of breath that has been going on for more than a few days.  He was seen a few days ago by another provider and started on Augmentin and given an albuterol inhaler for treatment of pneumonia per patient.  He is hemodynamically stable, afebrile, nontoxic-appearing on exam.  PERC negative.  97% oxygen saturation on room air.  No recent travel, surgeries, or hormone use.  No history of blood clots.  Differential diagnosis includes was not limited to viral bronchitis, pneumonia, cardiac arrhythmia, among others.  Initial plan includes chest x-ray, CBC, CMP, COVID/flu swab, and EKG.    Chest x-ray reveals hypoinflation but otherwise unremarkable per radiology.  Labs are unremarkable.  EKG as reported above by ED attending reveals sinus rhythm.  Patient was advised that this is likely a viral illness and will likely get better without antibiotics.  However, given that he was started on Augmentin for treatment of pneumonia, prescription was sent for Z-Lio for completion of pneumonia treatment to cover atypical bacteria.  He was advised that he could try to give it a couple days and see if symptoms improve without the antibiotic as this is likely a viral illness.  He was given a dose of Decadron for treatment of  bronchitis.  Patient is agreeable with plan for discharge.  Precautions were given for return to the ER for any new or worsening symptoms.    Final diagnoses:   Bronchitis          Kaleigh Collado, PA-C  09/18/23 1529

## 2023-09-28 ENCOUNTER — OFFICE VISIT (OUTPATIENT)
Dept: PSYCHIATRY | Facility: CLINIC | Age: 33
End: 2023-09-28
Payer: COMMERCIAL

## 2023-09-28 DIAGNOSIS — F41.8 DEPRESSION WITH ANXIETY: Primary | ICD-10-CM

## 2023-10-03 NOTE — PROGRESS NOTES
Date:2023   Patient Name: Nikolas Cottrell  : 1990   MRN: [id number]   Time IN: 3:30PM    Time OUT: 4:30PM     Referring Provider: Marleen Patrick MD    PROGRESS NOTE    History of Present Illness:   Nikolas Cottrell is a 33 y.o. male who is being seen today for follow up individual Psychotherapy session.     Chief Complaint:      ICD-10-CM ICD-9-CM   1. Depression with anxiety  F41.8 300.4        Clinical Maneuvering/Intervention:   Assisted patient in processing above session content; acknowledged and normalized patient’s thoughts, feelings, and concerns to build appropriate rapport and a positive therapeutic relationship with open and honest communication.  Processed and rationalized patients thoughts and feelings regarding recent circumstances and feelings. Pt reported a new job opportunity in [place] that would be good pay and hours, but unsure if accepting. Pt reported that he has been sitting in silence often, thinking of nothing (shut down). He is feeling shut down emotionally again. He feels like a burden when he wants to talk with Farida  and reports this may be due to a fear of people not understanding due to his past. He is feeling numb currently and expresses a fear of getting hurt again. Pt and provider discussed a topic that he talk to Farida about to begin emotional expression within the relationship. Pt also requested to bring her to a session one day to help explain what is happening internally for him - pt feels it would be helpful and provider agreed to idea.  Discussed triggers associated with patient's depression.  Also discussed coping skills for patient to implement such as talking with loved ones about emotions and offering self-compassion.    Allowed patient to freely discuss issues without interruption or judgment. Provided safe, confidential environment to facilitate the development of positive therapeutic relationship and encourage open, honest  communication. Assisted patient in identifying risk factors which would indicate the need for higher level of care including thoughts to harm self or others and/or self-harming behavior and encouraged patient to contact this office, call 911, or present to the nearest emergency room should any of these events occur. Discussed crisis intervention services and means to access. Patient adamantly and convincingly denies current suicidal or homicidal ideation or perceptual disturbance.    MENTAL STATUS EXAM   General Appearance:  Cleanly groomed and dressed  Eye Contact:  Good eye contact  Attitude:  Cooperative  Motor Activity:  Normal gait, posture  Speech:  Normal rate, tone, volume  Mood and affect:  Depressed  Thought Process:  Logical  Associations/ Thought Content:  No delusions  Suicidal Ideations:  Not present  Homicidal Ideation:  Not present    Patient's Support Network Includes:  significant other    Functional Status: Moderate impairment     Progress toward goal: Not at goal    Prognosis: Fair with Ongoing Treatment     Medications:     Current Outpatient Medications:     azithromycin (Zithromax Z-Lio) 250 MG tablet, Take 2 tablets by mouth on day 1, then 1 tablet daily on days 2-5, Disp: 6 tablet, Rfl: 0    escitalopram (Lexapro) 10 MG tablet, Take 1 tablet by mouth Daily., Disp: 30 tablet, Rfl: 1    hydrOXYzine (ATARAX) 10 MG tablet, Take 1 tablet by mouth 4 (Four) Times a Day As Needed (anxiety or sleep)., Disp: 120 tablet, Rfl: 1    Visit Diagnosis/Orders Placed This Visit:  Diagnoses and all orders for this visit:    1. Depression with anxiety (Primary)        PLAN:  Safety: No acute safety concerns  Risk Assessment: Risk of self-harm acutely is low. Risk of self-harm chronically is also low, but could be further elevated in the event of treatment noncompliance and/or AODA.        Treatment Plan/Goals: Patient will continue supportive psychotherapy efforts and medication regimen as prescribed. Therapist  will provide Cognitive Behavioral Therapy to assist patient in improving functioning and gaining coping skills, maintaining stability, and avoiding decompensation and the need for higher level of care. Plan for treatment was discussed during today's visit. Patient acknowledged and verbally consented to continue with current treatment plan and was educated on the importance of compliance with treatment and follow-up appointments.     Patient will contact this office, call 911 or present to the nearest emergency room should suicidal or homicidal ideations occur.     Follow Up:   No follow-ups on file.    SALINA Parry   [facility]     This document has been electronically signed by SALINA Sanford  October 3, 2023 13:23 EDT

## 2023-10-23 ENCOUNTER — TELEPHONE (OUTPATIENT)
Dept: PSYCHIATRY | Facility: CLINIC | Age: 33
End: 2023-10-23

## 2023-10-23 NOTE — TELEPHONE ENCOUNTER
Tried to reach patient regarding 3 no shows, and to advise that apt on 10/31 has been cancelled due to non compliance. If patient calls in, we can schedule one appointment at time, and continue as long as patient is compliant and follows the no show policy. Thank you.

## 2023-12-04 ENCOUNTER — APPOINTMENT (OUTPATIENT)
Dept: CT IMAGING | Facility: HOSPITAL | Age: 33
End: 2023-12-04
Payer: MEDICAID

## 2023-12-04 ENCOUNTER — HOSPITAL ENCOUNTER (EMERGENCY)
Facility: HOSPITAL | Age: 33
Discharge: HOME | End: 2023-12-04
Attending: EMERGENCY MEDICINE | Admitting: EMERGENCY MEDICINE
Payer: MEDICAID

## 2023-12-04 VITALS
BODY MASS INDEX: 29.8 KG/M2 | TEMPERATURE: 98 F | RESPIRATION RATE: 16 BRPM | OXYGEN SATURATION: 100 % | HEART RATE: 83 BPM | SYSTOLIC BLOOD PRESSURE: 109 MMHG | DIASTOLIC BLOOD PRESSURE: 66 MMHG | WEIGHT: 220 LBS | HEIGHT: 72 IN

## 2023-12-04 DIAGNOSIS — K52.9 COLITIS: Primary | ICD-10-CM

## 2023-12-04 LAB
ALBUMIN SERPL-MCNC: 5 G/DL (ref 3.5–5.2)
ALBUMIN/GLOB SERPL: 1.6 G/DL
ALP SERPL-CCNC: 145 U/L (ref 39–117)
ALT SERPL W P-5'-P-CCNC: 21 U/L (ref 1–41)
ANION GAP SERPL CALCULATED.3IONS-SCNC: 12.8 MMOL/L (ref 5–15)
AST SERPL-CCNC: 18 U/L (ref 1–40)
BACTERIA UR QL AUTO: NORMAL /HPF
BASOPHILS # BLD AUTO: 0.04 10*3/MM3 (ref 0–0.2)
BASOPHILS NFR BLD AUTO: 0.4 % (ref 0–1.5)
BILIRUB SERPL-MCNC: 0.4 MG/DL (ref 0–1.2)
BILIRUB UR QL STRIP: NEGATIVE
BUN SERPL-MCNC: 11 MG/DL (ref 6–20)
BUN/CREAT SERPL: 13.3 (ref 7–25)
CALCIUM SPEC-SCNC: 9.4 MG/DL (ref 8.6–10.5)
CHLORIDE SERPL-SCNC: 102 MMOL/L (ref 98–107)
CLARITY UR: CLEAR
CO2 SERPL-SCNC: 26.2 MMOL/L (ref 22–29)
COLOR UR: ABNORMAL
CREAT SERPL-MCNC: 0.83 MG/DL (ref 0.76–1.27)
DEPRECATED RDW RBC AUTO: 38.8 FL (ref 37–54)
EGFRCR SERPLBLD CKD-EPI 2021: 117.8 ML/MIN/1.73
EOSINOPHIL # BLD AUTO: 0.18 10*3/MM3 (ref 0–0.4)
EOSINOPHIL NFR BLD AUTO: 1.7 % (ref 0.3–6.2)
ERYTHROCYTE [DISTWIDTH] IN BLOOD BY AUTOMATED COUNT: 11.8 % (ref 12.3–15.4)
GLOBULIN UR ELPH-MCNC: 3.2 GM/DL
GLUCOSE SERPL-MCNC: 86 MG/DL (ref 65–99)
GLUCOSE UR STRIP-MCNC: NEGATIVE MG/DL
HCT VFR BLD AUTO: 50.8 % (ref 37.5–51)
HGB BLD-MCNC: 17.6 G/DL (ref 13–17.7)
HGB UR QL STRIP.AUTO: ABNORMAL
HOLD SPECIMEN: NORMAL
HOLD SPECIMEN: NORMAL
HYALINE CASTS UR QL AUTO: NORMAL /LPF
IMM GRANULOCYTES # BLD AUTO: 0.05 10*3/MM3 (ref 0–0.05)
IMM GRANULOCYTES NFR BLD AUTO: 0.5 % (ref 0–0.5)
KETONES UR QL STRIP: ABNORMAL
LEUKOCYTE ESTERASE UR QL STRIP.AUTO: NEGATIVE
LIPASE SERPL-CCNC: 27 U/L (ref 13–60)
LYMPHOCYTES # BLD AUTO: 2.98 10*3/MM3 (ref 0.7–3.1)
LYMPHOCYTES NFR BLD AUTO: 27.4 % (ref 19.6–45.3)
MCH RBC QN AUTO: 31.3 PG (ref 26.6–33)
MCHC RBC AUTO-ENTMCNC: 34.6 G/DL (ref 31.5–35.7)
MCV RBC AUTO: 90.4 FL (ref 79–97)
MONOCYTES # BLD AUTO: 1.23 10*3/MM3 (ref 0.1–0.9)
MONOCYTES NFR BLD AUTO: 11.3 % (ref 5–12)
NEUTROPHILS NFR BLD AUTO: 58.7 % (ref 42.7–76)
NEUTROPHILS NFR BLD AUTO: 6.4 10*3/MM3 (ref 1.7–7)
NITRITE UR QL STRIP: NEGATIVE
NRBC BLD AUTO-RTO: 0 /100 WBC (ref 0–0.2)
PH UR STRIP.AUTO: 6 [PH] (ref 5–8)
PLATELET # BLD AUTO: 266 10*3/MM3 (ref 140–450)
PMV BLD AUTO: 8.3 FL (ref 6–12)
POTASSIUM SERPL-SCNC: 3.3 MMOL/L (ref 3.5–5.2)
PROT SERPL-MCNC: 8.2 G/DL (ref 6–8.5)
PROT UR QL STRIP: ABNORMAL
RBC # BLD AUTO: 5.62 10*6/MM3 (ref 4.14–5.8)
RBC # UR STRIP: NORMAL /HPF
REF LAB TEST METHOD: NORMAL
SODIUM SERPL-SCNC: 141 MMOL/L (ref 136–145)
SP GR UR STRIP: >=1.03 (ref 1–1.03)
SQUAMOUS #/AREA URNS HPF: NORMAL /HPF
UROBILINOGEN UR QL STRIP: ABNORMAL
WBC # UR STRIP: NORMAL /HPF
WBC NRBC COR # BLD AUTO: 10.88 10*3/MM3 (ref 3.4–10.8)
WHOLE BLOOD HOLD COAG: NORMAL
WHOLE BLOOD HOLD SPECIMEN: NORMAL

## 2023-12-04 PROCEDURE — 25510000001 IOPAMIDOL 61 % SOLUTION: Performed by: EMERGENCY MEDICINE

## 2023-12-04 PROCEDURE — 81001 URINALYSIS AUTO W/SCOPE: CPT | Performed by: EMERGENCY MEDICINE

## 2023-12-04 PROCEDURE — 25810000003 SODIUM CHLORIDE 0.9 % SOLUTION: Performed by: EMERGENCY MEDICINE

## 2023-12-04 PROCEDURE — 85025 COMPLETE CBC W/AUTO DIFF WBC: CPT

## 2023-12-04 PROCEDURE — 83690 ASSAY OF LIPASE: CPT

## 2023-12-04 PROCEDURE — 25010000002 ONDANSETRON PER 1 MG: Performed by: EMERGENCY MEDICINE

## 2023-12-04 PROCEDURE — 96374 THER/PROPH/DIAG INJ IV PUSH: CPT

## 2023-12-04 PROCEDURE — 25010000002 KETOROLAC TROMETHAMINE PER 15 MG: Performed by: EMERGENCY MEDICINE

## 2023-12-04 PROCEDURE — 25010000002 MORPHINE PER 10 MG: Performed by: EMERGENCY MEDICINE

## 2023-12-04 PROCEDURE — 36415 COLL VENOUS BLD VENIPUNCTURE: CPT

## 2023-12-04 PROCEDURE — 96375 TX/PRO/DX INJ NEW DRUG ADDON: CPT

## 2023-12-04 PROCEDURE — 74177 CT ABD & PELVIS W/CONTRAST: CPT

## 2023-12-04 PROCEDURE — 99285 EMERGENCY DEPT VISIT HI MDM: CPT

## 2023-12-04 PROCEDURE — 80053 COMPREHEN METABOLIC PANEL: CPT

## 2023-12-04 PROCEDURE — 81003 URINALYSIS AUTO W/O SCOPE: CPT

## 2023-12-04 RX ORDER — KETOROLAC TROMETHAMINE 30 MG/ML
15 INJECTION, SOLUTION INTRAMUSCULAR; INTRAVENOUS ONCE
Status: COMPLETED | OUTPATIENT
Start: 2023-12-04 | End: 2023-12-04

## 2023-12-04 RX ORDER — SODIUM CHLORIDE 0.9 % (FLUSH) 0.9 %
10 SYRINGE (ML) INJECTION AS NEEDED
Status: DISCONTINUED | OUTPATIENT
Start: 2023-12-04 | End: 2023-12-05 | Stop reason: HOSPADM

## 2023-12-04 RX ORDER — ONDANSETRON 2 MG/ML
4 INJECTION INTRAMUSCULAR; INTRAVENOUS ONCE
Status: COMPLETED | OUTPATIENT
Start: 2023-12-04 | End: 2023-12-04

## 2023-12-04 RX ORDER — ATROPINE SULFATE 0.4 MG/ML
0.4 INJECTION, SOLUTION INTRAMUSCULAR; INTRAVENOUS; SUBCUTANEOUS ONCE
Status: COMPLETED | OUTPATIENT
Start: 2023-12-04 | End: 2023-12-04

## 2023-12-04 RX ORDER — KETOROLAC TROMETHAMINE 10 MG/1
10 TABLET, FILM COATED ORAL EVERY 6 HOURS PRN
Qty: 12 TABLET | Refills: 0 | Status: SHIPPED | OUTPATIENT
Start: 2023-12-04

## 2023-12-04 RX ORDER — DIPHENOXYLATE HYDROCHLORIDE AND ATROPINE SULFATE 2.5; .025 MG/1; MG/1
1 TABLET ORAL 4 TIMES DAILY PRN
Qty: 12 TABLET | Refills: 0 | Status: SHIPPED | OUTPATIENT
Start: 2023-12-04

## 2023-12-04 RX ORDER — AMOXICILLIN AND CLAVULANATE POTASSIUM 875; 125 MG/1; MG/1
1 TABLET, FILM COATED ORAL 2 TIMES DAILY
Qty: 14 TABLET | Refills: 0 | Status: SHIPPED | OUTPATIENT
Start: 2023-12-04

## 2023-12-04 RX ORDER — ONDANSETRON 4 MG/1
4 TABLET, ORALLY DISINTEGRATING ORAL EVERY 8 HOURS PRN
Qty: 12 TABLET | Refills: 0 | Status: SHIPPED | OUTPATIENT
Start: 2023-12-04

## 2023-12-04 RX ORDER — AMOXICILLIN AND CLAVULANATE POTASSIUM 875; 125 MG/1; MG/1
1 TABLET, FILM COATED ORAL ONCE
Status: COMPLETED | OUTPATIENT
Start: 2023-12-04 | End: 2023-12-04

## 2023-12-04 RX ADMIN — ONDANSETRON 4 MG: 2 INJECTION INTRAMUSCULAR; INTRAVENOUS at 20:36

## 2023-12-04 RX ADMIN — SODIUM CHLORIDE 1000 ML: 9 INJECTION, SOLUTION INTRAVENOUS at 20:38

## 2023-12-04 RX ADMIN — ATROPINE SULFATE 0.4 MG: 0.4 INJECTION, SOLUTION INTRAVENOUS at 20:36

## 2023-12-04 RX ADMIN — AMOXICILLIN AND CLAVULANATE POTASSIUM 1 TABLET: 875; 125 TABLET, FILM COATED ORAL at 22:47

## 2023-12-04 RX ADMIN — KETOROLAC TROMETHAMINE 15 MG: 30 INJECTION, SOLUTION INTRAMUSCULAR at 21:53

## 2023-12-04 RX ADMIN — MORPHINE SULFATE 4 MG: 4 INJECTION, SOLUTION INTRAMUSCULAR; INTRAVENOUS at 20:37

## 2023-12-04 RX ADMIN — IOPAMIDOL 100 ML: 612 INJECTION, SOLUTION INTRAVENOUS at 20:49

## 2023-12-04 NOTE — Clinical Note
[facility] EMERGENCY DEPARTMENT  [address] [address]  Phone: [phone]    Nikolas Cottrell was seen and treated in our emergency department on 12/4/2023.  He may return to work on 12/07/2023.         Thank you for choosing [facility].    Cecille Mason MD

## 2023-12-05 NOTE — ED PROVIDER NOTES
TRIAGE CHIEF COMPLAINT:     Nursing and triage notes reviewed    Chief Complaint   Patient presents with    Abdominal Pain      HPI: Nikolas Cottrell is a 33 y.o. male who presents to the emergency department complaining of abdominal pain.  Patient states he has been having left lower abdominal discomfort since yesterday.  He states the pain worsened today.  He states that he thought he was constipated so took some magnesium citrate.  He has been having diarrhea and worsening pain since.  He denies vomiting but has had nausea.  Denies having a fever.    REVIEW OF SYSTEMS: All other systems reviewed and are negative     PAST MEDICAL HISTORY:   Past Medical History:   Diagnosis Date    Anxiety 2008    Back pain     Benzodiazepine dependence 2016    Depression     Seizures         FAMILY HISTORY:   Family History   Problem Relation Age of Onset    Stroke Father     Heart attack Father     Hypertension Father     Anxiety disorder Father         SOCIAL HISTORY:   Social History     Socioeconomic History    Marital status: Single   Tobacco Use    Smoking status: Former     Packs/day: 0.50     Years: 10.00     Additional pack years: 0.00     Total pack years: 5.00     Types: Cigarettes     Start date: 2023     Quit date: 2023     Years since quittin.8     Passive exposure: Past    Smokeless tobacco: Never   Vaping Use    Vaping Use: Every day    Substances: Nicotine    Devices: Disposable   Substance and Sexual Activity    Alcohol use: Never    Drug use: Never    Sexual activity: Yes     Partners: Female        SURGICAL HISTORY:   Past Surgical History:   Procedure Laterality Date    AMPUTATION FINGER / THUMB Left     3rd, 4th, and 5th digits     HAND SURGERY      SEPTOPLASTY          CURRENT MEDICATIONS:      Medication List        ASK your doctor about these medications      azithromycin 250 MG tablet  Commonly known as: Zithromax Z-Lio  Take 2 tablets by mouth on day 1, then 1 tablet daily on  days 2-5     escitalopram 10 MG tablet  Commonly known as: Lexapro  Take 1 tablet by mouth Daily.     hydrOXYzine 10 MG tablet  Commonly known as: ATARAX  Take 1 tablet by mouth 4 (Four) Times a Day As Needed (anxiety or sleep).               ALLERGIES: Patient has no known allergies.     PHYSICAL EXAM:   VITAL SIGNS:   Vitals:    12/04/23 1823   BP: 124/79   Pulse: 83   Resp: 16   Temp: 98 °F (36.7 °C)   SpO2: 100%      CONSTITUTIONAL: Awake, oriented, appears uncomfortable  HENT: Atraumatic, normocephalic, oral mucosa pink and moist, airway patent. Nares patent without drainage. External ears normal.   EYES: Conjunctivae clear   NECK: Trachea midline   CARDIOVASCULAR: Normal heart rate, Normal rhythm, No murmurs, rubs, gallops   PULMONARY/CHEST: Clear to auscultation, no rhonchi, wheezes, or rales. Symmetrical breath sounds.   ABDOMINAL: Nondistended, soft, diffuse tenderness to palpation  NEUROLOGIC: Nonfocal, moving all four extremities, no gross sensory or motor deficits.   EXTREMITIES: No clubbing, cyanosis, or edema   SKIN: Warm, Dry, No erythema, No rash     ED COURSE / MEDICAL DECISION MAKING:   Nikolas Cottrell is a 33 y.o. male who presents to the emergency department for evaluation of abdominal pain and diarrhea.  Patient is writhing around the bed on my exam.  He does have diffuse tenderness.    Differential diagnosis includes medication side effect, enteritis, colitis, intra-abdominal infection among other etiologies.    A CT scan of the abdomen pelvis, CBC, CMP was ordered for further evaluation of the patient's presentation.    Diagnostic information from other sources: Family, chart review    Interventions: IV fluids, morphine, atropine, Zofran    Narrative: Patient presents with abdominal pain and diarrhea.  Laboratory testing reveals a white blood cell count of 10.8.  Labs are otherwise largely unremarkable.  CT scan of the abdomen and pelvis per radiology interpretation reveals findings  consistent with enterocolitis.  Patient updated on all lab findings.  His pain is improved with treatment in the emergency department.  Will initiate antibiotics and further symptomatic management with return precautions.        DECISION TO DISCHARGE/ADMIT: see ED care timeline     FINAL IMPRESSION:   1 --colitis  2 --   3 --     Electronically signed by: Cecille Mason MD, 12/4/2023 20:28 Cecille Clayton MD  12/04/23 [id number]

## 2023-12-05 NOTE — ED NOTES
Pt reports PTA he felt as if he was constipated c/o intense knot in LLQ, pt drank a bottle of mag citrate and now c/o of diarrhea.

## 2024-01-30 ENCOUNTER — TELEPHONE (OUTPATIENT)
Dept: PSYCHIATRY | Facility: CLINIC | Age: 34
End: 2024-01-30

## 2024-01-30 NOTE — TELEPHONE ENCOUNTER
Nikolas previously seen Letty Turpin and had no showed the last three appointments with her. The office then scheduled Nikolas with Suki Blackwood, and he no showed twice on 1/22/24, 1/29/24. Suki has not seen him at all, and he has several upcoming appointments scheduled. A copy of the policy letter was mailed on 1/30/24. Per Suki Blackwood, his future appointments have been cancelled, and he will need to be provided outside resources. Thank you.

## 2025-02-06 ENCOUNTER — APPOINTMENT (OUTPATIENT)
Dept: GENERAL RADIOLOGY | Facility: HOSPITAL | Age: 35
End: 2025-02-06
Payer: COMMERCIAL

## 2025-02-06 ENCOUNTER — APPOINTMENT (OUTPATIENT)
Dept: CT IMAGING | Facility: HOSPITAL | Age: 35
End: 2025-02-06
Payer: COMMERCIAL

## 2025-02-06 ENCOUNTER — HOSPITAL ENCOUNTER (EMERGENCY)
Facility: HOSPITAL | Age: 35
Discharge: HOME | End: 2025-02-06
Attending: EMERGENCY MEDICINE
Payer: COMMERCIAL

## 2025-02-06 VITALS
WEIGHT: 220 LBS | RESPIRATION RATE: 18 BRPM | TEMPERATURE: 98.4 F | OXYGEN SATURATION: 95 % | HEART RATE: 99 BPM | HEIGHT: 72 IN | DIASTOLIC BLOOD PRESSURE: 88 MMHG | SYSTOLIC BLOOD PRESSURE: 133 MMHG | BODY MASS INDEX: 29.8 KG/M2
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
## 2025-02-06 DIAGNOSIS — S13.4XXA WHIPLASH INJURY TO NECK, INITIAL ENCOUNTER: Primary | ICD-10-CM

## 2025-02-06 LAB
ALBUMIN SERPL-MCNC: 4.9 G/DL (ref 3.5–5.2)
ALBUMIN/GLOB SERPL: 1.7 G/DL
ALP SERPL-CCNC: 146 U/L (ref 39–117)
ALT SERPL W P-5'-P-CCNC: 23 U/L (ref 1–41)
ANION GAP SERPL CALCULATED.3IONS-SCNC: 10.2 MMOL/L (ref 5–15)
AST SERPL-CCNC: 23 U/L (ref 1–40)
BACTERIA UR QL AUTO: NORMAL /HPF
BASOPHILS # BLD AUTO: 0.1 10*3/MM3 (ref 0–0.2)
BASOPHILS NFR BLD AUTO: 1.1 % (ref 0–1.5)
BILIRUB SERPL-MCNC: 0.5 MG/DL (ref 0–1.2)
BILIRUB UR QL STRIP: NEGATIVE
BUN SERPL-MCNC: 13 MG/DL (ref 6–20)
BUN/CREAT SERPL: 14.4 (ref 7–25)
CALCIUM SPEC-SCNC: 9.8 MG/DL (ref 8.6–10.5)
CHLORIDE SERPL-SCNC: 104 MMOL/L (ref 98–107)
CLARITY UR: CLEAR
CO2 SERPL-SCNC: 26.8 MMOL/L (ref 22–29)
COLOR UR: YELLOW
CREAT SERPL-MCNC: 0.9 MG/DL (ref 0.76–1.27)
DEPRECATED RDW RBC AUTO: 41 FL (ref 37–54)
EGFRCR SERPLBLD CKD-EPI 2021: 114.2 ML/MIN/1.73
EOSINOPHIL # BLD AUTO: 0.28 10*3/MM3 (ref 0–0.4)
EOSINOPHIL NFR BLD AUTO: 3.1 % (ref 0.3–6.2)
ERYTHROCYTE [DISTWIDTH] IN BLOOD BY AUTOMATED COUNT: 11.9 % (ref 12.3–15.4)
GLOBULIN UR ELPH-MCNC: 2.9 GM/DL
GLUCOSE SERPL-MCNC: 77 MG/DL (ref 65–99)
GLUCOSE UR STRIP-MCNC: NEGATIVE MG/DL
HCT VFR BLD AUTO: 53.1 % (ref 37.5–51)
HGB BLD-MCNC: 18.2 G/DL (ref 13–17.7)
HGB UR QL STRIP.AUTO: NEGATIVE
HYALINE CASTS UR QL AUTO: NORMAL /LPF
IMM GRANULOCYTES # BLD AUTO: 0.03 10*3/MM3 (ref 0–0.05)
IMM GRANULOCYTES NFR BLD AUTO: 0.3 % (ref 0–0.5)
KETONES UR QL STRIP: NEGATIVE
LEUKOCYTE ESTERASE UR QL STRIP.AUTO: NEGATIVE
LYMPHOCYTES # BLD AUTO: 2.57 10*3/MM3 (ref 0.7–3.1)
LYMPHOCYTES NFR BLD AUTO: 28.9 % (ref 19.6–45.3)
MCH RBC QN AUTO: 31.9 PG (ref 26.6–33)
MCHC RBC AUTO-ENTMCNC: 34.3 G/DL (ref 31.5–35.7)
MCV RBC AUTO: 93 FL (ref 79–97)
MONOCYTES # BLD AUTO: 0.71 10*3/MM3 (ref 0.1–0.9)
MONOCYTES NFR BLD AUTO: 8 % (ref 5–12)
NEUTROPHILS NFR BLD AUTO: 5.2 10*3/MM3 (ref 1.7–7)
NEUTROPHILS NFR BLD AUTO: 58.6 % (ref 42.7–76)
NITRITE UR QL STRIP: NEGATIVE
NRBC BLD AUTO-RTO: 0 /100 WBC (ref 0–0.2)
PH UR STRIP.AUTO: 7 [PH] (ref 5–8)
PLATELET # BLD AUTO: 290 10*3/MM3 (ref 140–450)
PMV BLD AUTO: 8.5 FL (ref 6–12)
POTASSIUM SERPL-SCNC: 4.1 MMOL/L (ref 3.5–5.2)
PROT SERPL-MCNC: 7.8 G/DL (ref 6–8.5)
PROT UR QL STRIP: ABNORMAL
RBC # BLD AUTO: 5.71 10*6/MM3 (ref 4.14–5.8)
RBC # UR STRIP: NORMAL /HPF
REF LAB TEST METHOD: NORMAL
SODIUM SERPL-SCNC: 141 MMOL/L (ref 136–145)
SP GR UR STRIP: 1.02 (ref 1–1.03)
SQUAMOUS #/AREA URNS HPF: NORMAL /HPF
UROBILINOGEN UR QL STRIP: ABNORMAL
WBC # UR STRIP: NORMAL /HPF
WBC NRBC COR # BLD AUTO: 8.89 10*3/MM3 (ref 3.4–10.8)

## 2025-02-06 PROCEDURE — 72170 X-RAY EXAM OF PELVIS: CPT

## 2025-02-06 PROCEDURE — 72125 CT NECK SPINE W/O DYE: CPT

## 2025-02-06 PROCEDURE — 85025 COMPLETE CBC W/AUTO DIFF WBC: CPT | Performed by: NURSE PRACTITIONER

## 2025-02-06 PROCEDURE — 70450 CT HEAD/BRAIN W/O DYE: CPT

## 2025-02-06 PROCEDURE — 99284 EMERGENCY DEPT VISIT MOD MDM: CPT | Performed by: EMERGENCY MEDICINE

## 2025-02-06 PROCEDURE — 72131 CT LUMBAR SPINE W/O DYE: CPT

## 2025-02-06 PROCEDURE — 71046 X-RAY EXAM CHEST 2 VIEWS: CPT

## 2025-02-06 PROCEDURE — 81001 URINALYSIS AUTO W/SCOPE: CPT | Performed by: NURSE PRACTITIONER

## 2025-02-06 PROCEDURE — 80053 COMPREHEN METABOLIC PANEL: CPT | Performed by: NURSE PRACTITIONER

## 2025-02-06 RX ORDER — CYCLOBENZAPRINE HCL 5 MG
5 TABLET ORAL 3 TIMES DAILY PRN
Qty: 15 TABLET | Refills: 0 | Status: SHIPPED | OUTPATIENT
Start: 2025-02-06 | End: 2025-02-11

## 2025-02-06 RX ORDER — SODIUM CHLORIDE 0.9 % (FLUSH) 0.9 %
10 SYRINGE (ML) INJECTION AS NEEDED
Status: DISCONTINUED | OUTPATIENT
Start: 2025-02-06 | End: 2025-02-06 | Stop reason: HOSPADM

## 2025-02-06 NOTE — Clinical Note
[facility] EMERGENCY DEPARTMENT  [address] [address]  Phone: [phone]    Nikolas Cottrell was seen and treated in our emergency department on 2/6/2025.  He may return to work on 02/10/2025.         Thank you for choosing [facility].    Chad Pike APRN
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
## 2025-02-11 ENCOUNTER — OFFICE VISIT (OUTPATIENT)
Dept: INTERNAL MEDICINE | Facility: CLINIC | Age: 35
End: 2025-02-11

## 2025-02-11 VITALS
SYSTOLIC BLOOD PRESSURE: 122 MMHG | HEIGHT: 72 IN | HEART RATE: 98 BPM | TEMPERATURE: 97.5 F | WEIGHT: 236.4 LBS | DIASTOLIC BLOOD PRESSURE: 84 MMHG | OXYGEN SATURATION: 98 % | BODY MASS INDEX: 32.02 KG/M2

## 2025-02-11 DIAGNOSIS — R51.9 ACUTE INTRACTABLE HEADACHE, UNSPECIFIED HEADACHE TYPE: ICD-10-CM
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
## 2025-02-11 PROCEDURE — 96372 THER/PROPH/DIAG INJ SC/IM: CPT | Performed by: FAMILY MEDICINE

## 2025-02-11 PROCEDURE — 99214 OFFICE O/P EST MOD 30 MIN: CPT | Performed by: FAMILY MEDICINE

## 2025-02-11 RX ORDER — IBUPROFEN 800 MG/1
800 TABLET, FILM COATED ORAL EVERY 8 HOURS PRN
Qty: 90 TABLET | Refills: 1 | Status: SHIPPED | OUTPATIENT
Start: 2025-02-11

## 2025-02-11 RX ORDER — PROMETHAZINE HYDROCHLORIDE 25 MG/1
25 TABLET ORAL EVERY 6 HOURS PRN
Qty: 30 TABLET | Refills: 0 | Status: SHIPPED | OUTPATIENT
Start: 2025-02-11

## 2025-02-11 RX ORDER — CYCLOBENZAPRINE HCL 10 MG
10 TABLET ORAL 3 TIMES DAILY PRN
Qty: 90 TABLET | Refills: 1 | Status: SHIPPED | OUTPATIENT
Start: 2025-02-11

## 2025-02-11 RX ORDER — METHYLPREDNISOLONE ACETATE 40 MG/ML
40 INJECTION, SUSPENSION INTRA-ARTICULAR; INTRALESIONAL; INTRAMUSCULAR; SOFT TISSUE ONCE
Status: COMPLETED | OUTPATIENT
Start: 2025-02-11 | End: 2025-02-11

## 2025-02-11 RX ORDER — IBUPROFEN 600 MG/1
600 TABLET, FILM COATED ORAL EVERY 6 HOURS PRN
COMMUNITY
End: 2025-02-11

## 2025-02-11 RX ADMIN — METHYLPREDNISOLONE ACETATE 40 MG: 40 INJECTION, SUSPENSION INTRA-ARTICULAR; INTRALESIONAL; INTRAMUSCULAR; SOFT TISSUE at 11:31
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
## 2025-02-17 ENCOUNTER — PATIENT MESSAGE (OUTPATIENT)
Dept: INTERNAL MEDICINE | Facility: CLINIC | Age: 35
End: 2025-02-17
Payer: COMMERCIAL